# Patient Record
Sex: MALE | Race: WHITE | NOT HISPANIC OR LATINO | Employment: FULL TIME | URBAN - METROPOLITAN AREA
[De-identification: names, ages, dates, MRNs, and addresses within clinical notes are randomized per-mention and may not be internally consistent; named-entity substitution may affect disease eponyms.]

---
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
## 2020-08-17 PROBLEM — S02.2XXA CLOSED FRACTURE OF NASAL BONE: Status: ACTIVE | Noted: 2017-03-24

## 2020-08-17 PROBLEM — S02.2XXA CLOSED FRACTURE OF NASAL BONE: Status: RESOLVED | Noted: 2017-03-24 | Resolved: 2020-08-17

## 2021-02-21 ENCOUNTER — NURSE TRIAGE (OUTPATIENT)
Dept: NURSING | Facility: CLINIC | Age: 21
End: 2021-02-21

## 2021-02-21 NOTE — TELEPHONE ENCOUNTER
Mom reports pt drank tequilla last evening and is now vomiting.  Mom does not have concerns for alcohol addiction/withrawal, states pt consumes < 1 alcoholic drink per week.  Pt has been vomiting x 2.5 hours.      Disposition:  Home care, education provided.  Advised Mom to focus on fluid hydration.  She verbalized understanding and had no further questions.     COVID 19 Nurse Triage Plan/Patient Instructions    Please be aware that novel coronavirus (COVID-19) may be circulating in the community. If you develop symptoms such as fever, cough, or SOB or if you have concerns about the presence of another infection including coronavirus (COVID-19), please contact your health care provider or visit [email or web address].     Disposition/Instructions    Home care recommended. Follow home care protocol based instructions.    Thank you for taking steps to prevent the spread of this virus.  o Limit your contact with others.  o Wear a simple mask to cover your cough.  o Wash your hands well and often.    Resources    [facility]: About COVID-19: [email or web address]    CDC: What to Do If You're Sick: www.cdc.gov/coronavirus/2019-ncov/about/steps-when-sick.html    CDC: Ending Home Isolation: www.cdc.gov/coronavirus/2019-ncov/hcp/disposition-in-home-patients.html     CDC: Caring for Someone: www.cdc.gov/coronavirus/2019-ncov/if-you-are-sick/care-for-someone.html     [facility]: Interim Guidance for Hospital Discharge to Home: www.[facility]/diseases/coronavirus/hcp/hospdischarge.pdf    [facility] clinical trials (COVID-19 research studies): clinicalaffairs.[facility]/umn-clinical-trials     Below are the COVID-19 hotlines at the [facility] of Health ([facility]). Interpreters are available.   o For health questions: Call [phone] or 1-937.940.3480 (7 a.m. to 7 p.m.)  o For questions about schools and childcare: Call [phone] or 1-294.882.9206 (7 a.m. to 7 p.m.)             Smita Bhatti  "RN/FNA          Additional Information    Negative: Shock suspected (e.g., cold/pale/clammy skin, too weak to stand, low BP, rapid pulse)    Negative: Difficult to awaken or acting confused (e.g., disoriented, slurred speech)    Negative: Sounds like a life-threatening emergency to the triager    Negative: [1] Vomiting AND [2] contains red blood or black (\"coffee ground\") material  (Exception: few red streaks in vomit that only happened once)    Negative: Severe pain in one eye    Negative: Recent head injury (within last 3 days)    Negative: Recent abdominal injury (within last 3 days)    Negative: [1] Insulin-dependent diabetes (Type I) AND [2] glucose > 400 mg/dl (22 mmol/l)    Negative: [1] SEVERE vomiting (e.g., 6 or more times/day) AND [2] present > 8 hours    Negative: [1] MODERATE vomiting (e.g., 3 - 5 times/day) AND [2] age > 60    Negative: Severe headache (e.g., excruciating) (Exception: similar to previous migraines)    Negative: High-risk adult (e.g., diabetes mellitus, brain tumor, V-P shunt, hernia)    Negative: [1] Drinking very little AND [2] dehydration suspected (e.g., no urine > 12 hours, very dry mouth, very lightheaded)    Negative: Patient sounds very sick or weak to the triager    Negative: [1] Vomiting AND [2] abdomen looks much more swollen than usual    Negative: [1] Vomiting AND [2] contains bile (green color)    Negative: [1] Constant abdominal pain AND [2] present > 2 hours    Negative: [1] Fever > 103 F (39.4 C) AND [2] not able to get the fever down using Fever Care Advice    Negative: [1] Fever > 101 F (38.3 C) AND [2] age > 60    Negative: [1] Fever > 100.0 F (37.8 C) AND [2] bedridden (e.g., nursing home patient, CVA, chronic illness, recovering from surgery)    Negative: [1] Fever > 100.0 F (37.8 C) AND [2] weak immune system (e.g., HIV positive, cancer chemo, splenectomy, organ transplant, chronic steroids)    Negative: Taking any of the following medications: digoxin (Lanoxin), " lithium, theophylline, phenytoin (Dilantin)    Negative: [1] MILD or MODERATE vomiting AND [2] present > 48 hours (2 days) (Exception: mild vomiting with associated diarrhea)    Negative: Fever present > 3 days (72 hours)    Negative: Vomiting a prescription medication    Negative: [1] MILD vomiting with diarrhea AND [2] present > 5 days    Negative: Alcohol or drug abuse, known or suspected    Negative: Vomiting is a chronic symptom (recurrent or ongoing AND present > 4 weeks)    [1] SEVERE vomiting (e.g., 6 or more times/day, vomits everything) BUT [2] hydrated    Protocols used: VOMITING-A-AH

## 2021-04-29 ENCOUNTER — OFFICE VISIT (OUTPATIENT)
Dept: URGENT CARE | Facility: URGENT CARE | Age: 21
End: 2021-04-29
Payer: COMMERCIAL

## 2021-04-29 ENCOUNTER — ANCILLARY PROCEDURE (OUTPATIENT)
Dept: GENERAL RADIOLOGY | Facility: CLINIC | Age: 21
End: 2021-04-29
Attending: FAMILY MEDICINE
Payer: COMMERCIAL

## 2021-04-29 VITALS
WEIGHT: 157 LBS | RESPIRATION RATE: 16 BRPM | BODY MASS INDEX: 23.18 KG/M2 | TEMPERATURE: 97.6 F | HEART RATE: 87 BPM | DIASTOLIC BLOOD PRESSURE: 72 MMHG | SYSTOLIC BLOOD PRESSURE: 108 MMHG | OXYGEN SATURATION: 98 %

## 2021-04-29 DIAGNOSIS — S06.0X0A CONCUSSION WITHOUT LOSS OF CONSCIOUSNESS, INITIAL ENCOUNTER: Primary | ICD-10-CM

## 2021-04-29 DIAGNOSIS — M79.672 LEFT FOOT PAIN: ICD-10-CM

## 2021-04-29 PROCEDURE — 73630 X-RAY EXAM OF FOOT: CPT | Mod: LT | Performed by: RADIOLOGY

## 2021-04-29 PROCEDURE — 99204 OFFICE O/P NEW MOD 45 MIN: CPT | Performed by: FAMILY MEDICINE

## 2021-04-29 NOTE — LETTER
April 29, 2021      Zack Simmons  [address] [place] [id number]        To Whom It May Concern,      Zack Simmons was seen today. Please excuse him from work until May 3rd, 2021     Sincerely,        [facility] Provider

## 2021-04-29 NOTE — PROGRESS NOTES
Assessment & Plan     Concussion without loss of consciousness, initial encounter  No sing intracranial bleed. Discussed worrisome symptoms and when to seek care. educational info given.. recommended concussion referrral. Note for work given.   - CONCUSSION  REFERRAL    Left foot pain  Probably a contusion/strain. Conservative treatment measures discussed..   - XR Foot Left G/E 3 Views       [id number]}    Return in about 1 week (around 5/6/2021) as directed by the the concussion clinic. .    Chuy Jay MD  [facility]    ------------------------------------------------------------------------  Subjective     Zack Simmons presents to clinic today for the following health issues:  chief complaint  HPI  1: head injury. Ran into another skater and fell onto his head onto concrete. No loc. Was nauseated and vomitined once last night. None since. Still feels draggy, headache, neck stiffness, low energery among other typical concussion symptoms. The patient has a history of multiple concussion in the past.   2: foot pain. left since this same fall hurts about the 1st ray but some general achiness proximal up calf. No swelling noted. No redness nor bruising. He does not recall the [facility] of injury.       Review of Systems        Objective    /72 (BP Location: Right arm, Patient Position: Sitting, Cuff Size: Adult Regular)   Pulse 87   Temp 97.6  F (36.4  C) (Oral)   Resp 16   Wt 71.2 kg (157 lb)   SpO2 98%   BMI 23.18 kg/m    Physical Exam   GENERAL: healthy, alert and no distress  EYES: Eyes grossly normal to inspection, PERRL and conjunctivae and sclerae normal  HENT: ear canals and TM's normal, nose and mouth without ulcers or lesions  NECK: no adenopathy, no asymmetry, masses, or scars and thyroid normal to palpation  RESP: lungs clear to auscultation - no rales, rhonchi or wheezes  CV: regular rate and rhythm, normal S1 S2, no S3 or S4, no murmur, click or rub, no peripheral edema and  peripheral pulses strong  MS: left lower extremity with tenderness to palpation  ovfer the 1st ray. No deformity nor swelling nor bruising noted.  SKIN: no suspicious lesions or rashes  NEURO: Normal strength and tone, sensory exam grossly normal, mentation intact and speech normal. Cranial nerve exam negative. Coordination normal.      Xray of his foot is without fracture Per my interpretation.

## 2021-04-29 NOTE — PATIENT INSTRUCTIONS
"  Patient Education   Concussion Discharge Instructions  You were seen today for signs of a concussion. The symptoms will vary, depending on the nature of your injury and your health. You may have: headache, confusion, nausea (feel sick to your stomach), vomiting (throwing up) and problems with memory, concentrating or sleep. You may feel dizzy, irritable, and tired.   Children and teens may need help from their parents, teachers and coaches to watch for symptoms as they recover.  Follow-up  It is important for you to see a doctor for follow-up care to see how you are recovering. Please see your primary doctor within the next 5 to 7 days. You may have also been referred to the Concussion  service. They will contact you and arrange a follow-up visit if needed. If you need help sooner, you may call them at [phone].  Warning signs  Call your doctor or come back to Emergency if you suddenly have any of these symptoms:    Headaches that get worse    Feeling more and more drowsy    You keep repeating yourself    Strange behavior    Seizures    Repeat vomiting (throwing up)    Trouble walking    Growing confusion    Feeling more irritable    Neck pain that gets worse    Slurred speech    Weakness or numbness    Loss of consciousness    Fluid or blood coming from ears or nose  Self-care    Get lots of rest and get enough sleep at night. Take daytime naps or rest if you feel tired.    Limit physical activity and \"thinking\" activities. These can make symptoms worse.  ? Physical activity includes gym, sports, weight training, running, exercise and heavy lifting.  ? Thinking activities include homework, class work, job-related work and screen time (phone, computer, tablet, TV and video games).    Stick to a healthy diet and drink lots of fluids.    As symptoms improve, you may slowly return to your daily activities. If symptoms get worse   or return, reduce your activity.    Know that it is normal to feel sad and " frustrated when you do not feel right and are less active.  Going back to work    Your care team will tell you when you are ready to return to work.    Limit the amount of work you do soon after your injury. This may speed healing. Take breaks if your symptoms get worse. You should also reduce your physical activity as well as activities that require a lot of thinking until you see your doctor.    You may need shorter work days and a lighter workload.    Avoid heavy lifting, working with machinery, driving and working at heights until your symptoms are gone or you are cleared by a doctor.  Returning to sports    Never return to play if you have any symptoms. A full recovery will reduce the chances of getting hurt again. Remember, it is better to miss one or two games than a whole season.    You should rest from all physical activity until you see your doctor. Generally, if all symptoms have completely cleared, your doctor can help guide you to slowly return to sports. If symptoms return or worsen, stop the activity and see your doctor.    Important: If you are in an organized sport and under age 18, you will need written consent from a healthcare provider before you return to sports. Typically, this will be your primary care or sports medicine doctor. Please make an appointment.  Going back to school    If you are still having symptoms, you may need extra help at school.    Tell your teachers and school nurse about your injury and symptoms. Ask them to watch for problems with learning, memory and concentrating. Symptoms may get worse when you do schoolwork, and you may become more irritable.    You may need shorter school days, a reduced workload, and to postpone testing.    Do not drive or take gym class (physical activity) until cleared by a doctor.    For informational purposes only. Not to replace the advice of your health care provider.   2009 [employer]. Used with permission.  "This form is adapted from the \"Heads Up: Brain Injury in Your Practice\" tool kit developed by the Centers for Disease Control and Prevention (CDC). All rights reserved. [facility]. [employer] [id number] - Rev 03/17.       "

## 2021-06-10 ENCOUNTER — HOSPITAL ENCOUNTER (EMERGENCY)
Facility: CLINIC | Age: 21
Discharge: HOME | End: 2021-06-11
Attending: EMERGENCY MEDICINE | Admitting: EMERGENCY MEDICINE
Payer: COMMERCIAL

## 2021-06-10 ENCOUNTER — TELEPHONE (OUTPATIENT)
Dept: FAMILY MEDICINE | Facility: CLINIC | Age: 21
End: 2021-06-10

## 2021-06-10 DIAGNOSIS — R11.2 NON-INTRACTABLE VOMITING WITH NAUSEA, UNSPECIFIED VOMITING TYPE: ICD-10-CM

## 2021-06-10 DIAGNOSIS — K29.00 ACUTE GASTRITIS WITHOUT HEMORRHAGE, UNSPECIFIED GASTRITIS TYPE: ICD-10-CM

## 2021-06-10 LAB
ALBUMIN SERPL-MCNC: 4.4 G/DL (ref 3.4–5)
ALBUMIN UR-MCNC: 50 MG/DL
ALP SERPL-CCNC: 104 U/L (ref 40–150)
ALT SERPL W P-5'-P-CCNC: 23 U/L (ref 0–70)
ANION GAP SERPL CALCULATED.3IONS-SCNC: 11 MMOL/L (ref 3–14)
APPEARANCE UR: CLEAR
AST SERPL W P-5'-P-CCNC: 23 U/L (ref 0–45)
BACTERIA #/AREA URNS HPF: ABNORMAL /HPF
BASOPHILS # BLD AUTO: 0 10E9/L (ref 0–0.2)
BASOPHILS NFR BLD AUTO: 0.2 %
BILIRUB SERPL-MCNC: 1 MG/DL (ref 0.2–1.3)
BILIRUB UR QL STRIP: ABNORMAL
BUN SERPL-MCNC: 16 MG/DL (ref 7–30)
CALCIUM SERPL-MCNC: 9.2 MG/DL (ref 8.5–10.1)
CHLORIDE SERPL-SCNC: 98 MMOL/L (ref 94–109)
CK SERPL-CCNC: 187 U/L (ref 30–300)
CO2 SERPL-SCNC: 27 MMOL/L (ref 20–32)
COLOR UR AUTO: YELLOW
CREAT SERPL-MCNC: 0.86 MG/DL (ref 0.66–1.25)
DIFFERENTIAL METHOD BLD: ABNORMAL
EOSINOPHIL # BLD AUTO: 0 10E9/L (ref 0–0.7)
EOSINOPHIL NFR BLD AUTO: 0.1 %
ERYTHROCYTE [DISTWIDTH] IN BLOOD BY AUTOMATED COUNT: 12 % (ref 10–15)
GFR SERPL CREATININE-BSD FRML MDRD: >90 ML/MIN/{1.73_M2}
GLUCOSE SERPL-MCNC: 83 MG/DL (ref 70–99)
GLUCOSE UR STRIP-MCNC: NEGATIVE MG/DL
HCT VFR BLD AUTO: 42.4 % (ref 40–53)
HGB BLD-MCNC: 14.1 G/DL (ref 13.3–17.7)
HGB UR QL STRIP: NEGATIVE
IMM GRANULOCYTES # BLD: 0.1 10E9/L (ref 0–0.4)
IMM GRANULOCYTES NFR BLD: 0.4 %
KETONES UR STRIP-MCNC: >150 MG/DL
LEUKOCYTE ESTERASE UR QL STRIP: NEGATIVE
LIPASE SERPL-CCNC: 73 U/L (ref 73–393)
LYMPHOCYTES # BLD AUTO: 1.8 10E9/L (ref 0.8–5.3)
LYMPHOCYTES NFR BLD AUTO: 15.7 %
MAGNESIUM SERPL-MCNC: 2.2 MG/DL (ref 1.6–2.3)
MCH RBC QN AUTO: 30 PG (ref 26.5–33)
MCHC RBC AUTO-ENTMCNC: 33.3 G/DL (ref 31.5–36.5)
MCV RBC AUTO: 90 FL (ref 78–100)
MONOCYTES # BLD AUTO: 0.9 10E9/L (ref 0–1.3)
MONOCYTES NFR BLD AUTO: 7.6 %
MUCOUS THREADS #/AREA URNS LPF: PRESENT /LPF
NEUTROPHILS # BLD AUTO: 8.9 10E9/L (ref 1.6–8.3)
NEUTROPHILS NFR BLD AUTO: 76 %
NITRATE UR QL: NEGATIVE
NRBC # BLD AUTO: 0 10*3/UL
NRBC BLD AUTO-RTO: 0 /100
PH UR STRIP: 6 PH (ref 5–7)
PLATELET # BLD AUTO: 280 10E9/L (ref 150–450)
POTASSIUM SERPL-SCNC: 3.8 MMOL/L (ref 3.4–5.3)
PROT SERPL-MCNC: 8.2 G/DL (ref 6.8–8.8)
RBC # BLD AUTO: 4.7 10E12/L (ref 4.4–5.9)
RBC #/AREA URNS AUTO: 1 /HPF (ref 0–2)
SODIUM SERPL-SCNC: 136 MMOL/L (ref 133–144)
SOURCE: ABNORMAL
SP GR UR STRIP: 1.02 (ref 1–1.03)
SQUAMOUS #/AREA URNS AUTO: 0 /HPF (ref 0–1)
TROPONIN I SERPL-MCNC: <0.015 UG/L (ref 0–0.04)
UROBILINOGEN UR STRIP-MCNC: 2 MG/DL (ref 0–2)
WBC # BLD AUTO: 11.7 10E9/L (ref 4–11)
WBC #/AREA URNS AUTO: 4 /HPF (ref 0–5)

## 2021-06-10 PROCEDURE — 258N000003 HC RX IP 258 OP 636: Performed by: EMERGENCY MEDICINE

## 2021-06-10 PROCEDURE — 96361 HYDRATE IV INFUSION ADD-ON: CPT

## 2021-06-10 PROCEDURE — 80053 COMPREHEN METABOLIC PANEL: CPT | Performed by: EMERGENCY MEDICINE

## 2021-06-10 PROCEDURE — 82550 ASSAY OF CK (CPK): CPT | Performed by: EMERGENCY MEDICINE

## 2021-06-10 PROCEDURE — 93010 ELECTROCARDIOGRAM REPORT: CPT | Performed by: EMERGENCY MEDICINE

## 2021-06-10 PROCEDURE — 250N000009 HC RX 250: Performed by: EMERGENCY MEDICINE

## 2021-06-10 PROCEDURE — 83690 ASSAY OF LIPASE: CPT | Performed by: EMERGENCY MEDICINE

## 2021-06-10 PROCEDURE — 96375 TX/PRO/DX INJ NEW DRUG ADDON: CPT

## 2021-06-10 PROCEDURE — 99284 EMERGENCY DEPT VISIT MOD MDM: CPT | Mod: 25 | Performed by: EMERGENCY MEDICINE

## 2021-06-10 PROCEDURE — 83735 ASSAY OF MAGNESIUM: CPT | Performed by: EMERGENCY MEDICINE

## 2021-06-10 PROCEDURE — 84484 ASSAY OF TROPONIN QUANT: CPT | Performed by: EMERGENCY MEDICINE

## 2021-06-10 PROCEDURE — 85025 COMPLETE CBC W/AUTO DIFF WBC: CPT | Performed by: EMERGENCY MEDICINE

## 2021-06-10 PROCEDURE — 96365 THER/PROPH/DIAG IV INF INIT: CPT | Mod: 59

## 2021-06-10 PROCEDURE — 99284 EMERGENCY DEPT VISIT MOD MDM: CPT | Mod: 25

## 2021-06-10 PROCEDURE — 250N000011 HC RX IP 250 OP 636: Performed by: EMERGENCY MEDICINE

## 2021-06-10 PROCEDURE — 93005 ELECTROCARDIOGRAM TRACING: CPT

## 2021-06-10 PROCEDURE — 250N000013 HC RX MED GY IP 250 OP 250 PS 637: Performed by: EMERGENCY MEDICINE

## 2021-06-10 PROCEDURE — 81001 URINALYSIS AUTO W/SCOPE: CPT | Performed by: EMERGENCY MEDICINE

## 2021-06-10 RX ORDER — IBUPROFEN 200 MG
600 TABLET ORAL EVERY 6 HOURS PRN
COMMUNITY
End: 2024-07-26

## 2021-06-10 RX ORDER — ONDANSETRON 4 MG/1
4 TABLET, FILM COATED ORAL EVERY 6 HOURS PRN
COMMUNITY
End: 2024-07-26

## 2021-06-10 RX ORDER — SODIUM CHLORIDE 9 MG/ML
INJECTION, SOLUTION INTRAVENOUS CONTINUOUS
Status: DISCONTINUED | OUTPATIENT
Start: 2021-06-10 | End: 2021-06-11 | Stop reason: HOSPADM

## 2021-06-10 RX ORDER — ONDANSETRON 2 MG/ML
4 INJECTION INTRAMUSCULAR; INTRAVENOUS EVERY 30 MIN PRN
Status: DISCONTINUED | OUTPATIENT
Start: 2021-06-10 | End: 2021-06-11 | Stop reason: HOSPADM

## 2021-06-10 RX ADMIN — LIDOCAINE HYDROCHLORIDE 30 ML: 20 SOLUTION ORAL; TOPICAL at 20:12

## 2021-06-10 RX ADMIN — SODIUM CHLORIDE 1000 ML: 9 INJECTION, SOLUTION INTRAVENOUS at 21:55

## 2021-06-10 RX ADMIN — SODIUM CHLORIDE 1000 ML: 9 INJECTION, SOLUTION INTRAVENOUS at 20:11

## 2021-06-10 RX ADMIN — FAMOTIDINE 20 MG: 20 INJECTION, SOLUTION INTRAVENOUS at 22:37

## 2021-06-10 RX ADMIN — ONDANSETRON 4 MG: 2 INJECTION INTRAMUSCULAR; INTRAVENOUS at 20:13

## 2021-06-10 NOTE — TELEPHONE ENCOUNTER
"Pt mom called to see what to do about son that had vomiting sat night and last night after drinking alcohol  She said it looked brown but denied it looking like coffee grounds.  Pt has no ac at his house - so got picked up in the night to sleep in his moms house with a/c  Pt at work now and drove himself there, but called his mom to report that his eye lids were closing and his mouth is \"shaped like an o\"  writer advised mom to monitor pt for dry mouth, voiding once every 8 hours, if he is still feeling drowsy, eyes are shutting or his mouth is \"shaped like an o\", pt should be evaluated at the ER  If those sx are improved, pt should push fluids and electrolytes.  Mom will call son and touch base with him and either call back for further advice, take him to UC or take him to the ER.   Kiki Barbosa RN    "

## 2021-06-11 VITALS
DIASTOLIC BLOOD PRESSURE: 76 MMHG | SYSTOLIC BLOOD PRESSURE: 113 MMHG | RESPIRATION RATE: 16 BRPM | OXYGEN SATURATION: 98 % | WEIGHT: 145 LBS | TEMPERATURE: 97.1 F | HEART RATE: 66 BPM | BODY MASS INDEX: 21.41 KG/M2

## 2021-06-11 LAB — INTERPRETATION ECG - MUSE: NORMAL

## 2021-06-11 RX ORDER — FAMOTIDINE 20 MG/1
20 TABLET, FILM COATED ORAL 2 TIMES DAILY
Qty: 28 TABLET | Refills: 0 | Status: SHIPPED | OUTPATIENT
Start: 2021-06-11 | End: 2021-06-25

## 2021-06-11 RX ORDER — ONDANSETRON 4 MG/1
4 TABLET, ORALLY DISINTEGRATING ORAL EVERY 8 HOURS PRN
Qty: 9 TABLET | Refills: 0 | Status: SHIPPED | OUTPATIENT
Start: 2021-06-11 | End: 2021-06-14

## 2021-06-11 NOTE — ED NOTES
Patient tolerated drinking but endorses mild abdominal pain after having few bites of the sandwich. Patient denies nausea

## 2021-06-11 NOTE — DISCHARGE INSTRUCTIONS
Please make an appointment to follow up with Your Primary Care Provider as soon as possible.    Take the pepcid as prescribed. Can use zofran for nausea. Stay well hydrated. Avoid alcohol, NSAIDs (ibuprofen, advil, naproxen), spicy food.     Return to the ED if you develop worsening abdominal pain, fever, uncontrolled vomiting, blood in the stool or vomit, lack of bowel movements, chest pain, shortness of breath, confusion, or any new or worsening concerns.

## 2021-06-11 NOTE — ED PROVIDER NOTES
"    [facility] EMERGENCY DEPARTMENT ([facility])  6/10/21     History     Chief Complaint   Patient presents with     Nausea & Vomiting     reports he was at his friend's house last night and had a beer and a shot, he started having epigastric pain and nausea and vomiting around midnight has night, symptoms have persisted since, unable to keep food or fluids down     The history is provided by the patient and medical records.     Zack Simmons is a 20 year old male with a past medical history significant for concussion and migraines who presents here to the Emergency Department due to nausea, vomiting, and abdominal pain.  Patient reports last night he was at a friend's house and he had \"one beer and one shot.\"  He states around midnight he developed epigastric pain with associated nausea and vomiting.  He states those symptoms have persisted through the night and throughout the day today.  Patient reports his symptoms did not feel like a hangover.  Patient took zofran and ibuprofen for his symptoms.  He denies other recent abdominal pain or vomiting.  Patient states around 3 PM today he was driving to work when he had an episode during which he felt as if his face was \"locking up.\"  He reports this muscle spasm like episode lasted on and off for about 20 minutes, though he was able to continue driving to work.  He denies loss of consciousness during this episode though he does note he had some trouble breathing during the episode.  Patient reports he has had similar episodes of muscle spasming in the past, but he has not gotten it checked out.  Patient states the last time he had an episode was a few months ago.  He states at that time he was not able to drive and his girlfriend had to drive for him.  Patient does note that alcohol was involved in his previous \"locked up\" episode.  Patient denies numbness, weakness, or tingling.  He denies history of seizures.  Patient reports yesterday he was outside in the " sun all day in a pool and skateboarding.  He states he only had a slice of pizza to eat yesterday, he denies poorly cooked or bad food.  He states he does not have air conditioning in his apartment.  Patient denies lower abdominal pain or diarrhea.  No dysuria, difficulty urinating, or hematuria.  He denies abnormal bowel movements, his last BM was yesterday.  He states he has still been able to pass flatus.  Patient does note he has unintentionally lost 13 lb in the past 6 weeks.  He denies history of reflux or ulcers.  Patient denies drug use.  He states he drinks socially on weekends only.  Patient denies fevers or chills.  No cough.  Patient denies having had COVID-19, he denies known COVID exposure.  He states he received his first COVID vaccine on 06/05/2021.  Patient has a remote familial history of heart problems. No recent falls or head injury.     Past Medical History  Past Medical History:   Diagnosis Date     Closed fracture of nasal bone 3/24/2017    3/12/17     Concussion 9/17/2014     Enuresis      Fibula fracture 10/9/2013     Migraine      Nocturnal enuresis 10/26/2012     Seasonal allergies      Traumatic brain injury (H) 9/30/2014     Past Surgical History:   Procedure Laterality Date     NO HISTORY OF SURGERY       ORTHOPEDIC SURGERY  05/2019    left wrist     ibuprofen (ADVIL/MOTRIN) 200 MG tablet  ondansetron (ZOFRAN) 4 MG tablet      Allergies   Allergen Reactions     Birch Trees      Cats      Family History  Family History   Problem Relation Age of Onset     Diabetes Maternal Grandfather      Diabetes Paternal Grandfather      Social History   Social History     Tobacco Use     Smoking status: Never Smoker     Smokeless tobacco: Never Used   Substance Use Topics     Alcohol use: No     Drug use: Not Currently     Types: Marijuana      Past medical history, past surgical history, medications, allergies, family history, and social history were reviewed with the patient. No additional pertinent  items.       Review of Systems  A complete review of systems was performed with pertinent positives and negatives noted in the HPI, and all other systems negative.    Physical Exam   BP: 113/76  Pulse: 60  Temp: 97.1  F (36.2  C)  Resp: 16  Weight: 65.8 kg (145 lb)  SpO2: 96 %  Physical Exam  Vitals reviewed.   Constitutional:       General: He is not in acute distress.     Appearance: He is well-developed.   HENT:      Head: Normocephalic and atraumatic.      Mouth/Throat:      Mouth: Mucous membranes are moist.      Pharynx: No oropharyngeal exudate or posterior oropharyngeal erythema.   Eyes:      Extraocular Movements: Extraocular movements intact.      Conjunctiva/sclera: Conjunctivae normal.      Pupils: Pupils are equal, round, and reactive to light.   Cardiovascular:      Rate and Rhythm: Normal rate and regular rhythm.      Pulses: Normal pulses.      Heart sounds: Normal heart sounds. No murmur heard.     Pulmonary:      Effort: Pulmonary effort is normal. No respiratory distress.      Breath sounds: Normal breath sounds. No stridor. No wheezing or rales.   Abdominal:      General: Bowel sounds are normal. There is no distension.      Palpations: Abdomen is soft. There is no mass.      Tenderness: There is no abdominal tenderness. There is no right CVA tenderness, left CVA tenderness, guarding or rebound.   Musculoskeletal:         General: No swelling or tenderness. Normal range of motion.      Cervical back: Normal range of motion and neck supple. No rigidity.   Skin:     General: Skin is warm and dry.      Capillary Refill: Capillary refill takes less than 2 seconds.      Findings: No rash.   Neurological:      General: No focal deficit present.      Mental Status: He is alert and oriented to person, place, and time.      GCS: GCS eye subscore is 4. GCS verbal subscore is 5. GCS motor subscore is 6.      Cranial Nerves: Cranial nerves are intact. No cranial nerve deficit, dysarthria or facial asymmetry.  "     Sensory: Sensation is intact. No sensory deficit.      Motor: Motor function is intact. No weakness, abnormal muscle tone or pronator drift.      Coordination: Coordination normal. Finger-Nose-Finger Test and Heel to Shin Test normal.      Gait: Gait normal.      Deep Tendon Reflexes:      Reflex Scores:       Brachioradialis reflexes are 2+ on the right side and 2+ on the left side.       Patellar reflexes are 2+ on the right side and 2+ on the left side.     Comments: 5/5 strength in all 4 extremities bilaterally. Sensation intact to light touch in all 4 extremities and the face bilaterally.    Psychiatric:         Mood and Affect: Mood normal.         ED Course     9:23 PM  The patient was seen and examined by Lucia Gutierrez MD in Room ED07.    Procedures            EKG Interpretation:      Interpreted by Lucia Gutierrez MD  Time reviewed: 2233  Symptoms at time of EKG: previous \"locking up\" episdoe   Rhythm: sinus bradycardia with PAC  Rate: 57  Axis: normal  Ectopy: PAC  Conduction: normal  ST Segments/ T Waves: No ST-T wave changes  Q Waves: none  Comparison to prior: No old EKG available    Clinical Impression: no evidence of acute ischemia. No signs of pericarditis. No evidence of WPW, brugada, LVH, or right ventricular dysplasia.              Results for orders placed or performed during the hospital encounter of 06/10/21   CBC with platelets differential     Status: Abnormal   Result Value Ref Range    WBC 11.7 (H) 4.0 - 11.0 10e9/L    RBC Count 4.70 4.4 - 5.9 10e12/L    Hemoglobin 14.1 13.3 - 17.7 g/dL    Hematocrit 42.4 40.0 - 53.0 %    MCV 90 78 - 100 fl    MCH 30.0 26.5 - 33.0 pg    MCHC 33.3 31.5 - 36.5 g/dL    RDW 12.0 10.0 - 15.0 %    Platelet Count 280 150 - 450 10e9/L    Diff Method Automated Method     % Neutrophils 76.0 %    % Lymphocytes 15.7 %    % Monocytes 7.6 %    % Eosinophils 0.1 %    % Basophils 0.2 %    % Immature Granulocytes 0.4 %    Nucleated RBCs 0 0 /100    Absolute " Neutrophil 8.9 (H) 1.6 - 8.3 10e9/L    Absolute Lymphocytes 1.8 0.8 - 5.3 10e9/L    Absolute Monocytes 0.9 0.0 - 1.3 10e9/L    Absolute Eosinophils 0.0 0.0 - 0.7 10e9/L    Absolute Basophils 0.0 0.0 - 0.2 10e9/L    Abs Immature Granulocytes 0.1 0 - 0.4 10e9/L    Absolute Nucleated RBC 0.0    Comprehensive metabolic panel     Status: None   Result Value Ref Range    Sodium 136 133 - 144 mmol/L    Potassium 3.8 3.4 - 5.3 mmol/L    Chloride 98 94 - 109 mmol/L    Carbon Dioxide 27 20 - 32 mmol/L    Anion Gap 11 3 - 14 mmol/L    Glucose 83 70 - 99 mg/dL    Urea Nitrogen 16 7 - 30 mg/dL    Creatinine 0.86 0.66 - 1.25 mg/dL    GFR Estimate >90 >60 mL/min/[1.73_m2]    GFR Estimate If Black >90 >60 mL/min/[1.73_m2]    Calcium 9.2 8.5 - 10.1 mg/dL    Bilirubin Total 1.0 0.2 - 1.3 mg/dL    Albumin 4.4 3.4 - 5.0 g/dL    Protein Total 8.2 6.8 - 8.8 g/dL    Alkaline Phosphatase 104 40 - 150 U/L    ALT 23 0 - 70 U/L    AST 23 0 - 45 U/L   Lipase     Status: None   Result Value Ref Range    Lipase 73 73 - 393 U/L   UA with Microscopic     Status: Abnormal   Result Value Ref Range    Color Urine Yellow     Appearance Urine Clear     Glucose Urine Negative NEG^Negative mg/dL    Bilirubin Urine Small (A) NEG^Negative    Ketones Urine >150 (A) NEG^Negative mg/dL    Specific Gravity Urine 1.020 1.003 - 1.035    Blood Urine Negative NEG^Negative    pH Urine 6.0 5.0 - 7.0 pH    Protein Albumin Urine 50 (A) NEG^Negative mg/dL    Urobilinogen mg/dL 2.0 0.0 - 2.0 mg/dL    Nitrite Urine Negative NEG^Negative    Leukocyte Esterase Urine Negative NEG^Negative    Source Nasopharyngeal     WBC Urine 4 0 - 5 /HPF    RBC Urine 1 0 - 2 /HPF    Bacteria Urine None (A) NEG^Negative /HPF    Squamous Epithelial /HPF Urine 0 0 - 1 /HPF    Mucous Urine Present (A) NEG^Negative /LPF   Magnesium     Status: None   Result Value Ref Range    Magnesium 2.2 1.6 - 2.3 mg/dL   CK total     Status: None   Result Value Ref Range    CK Total 187 30 - 300 U/L  "  Troponin I     Status: None   Result Value Ref Range    Troponin I ES <0.015 0.000 - 0.045 ug/L   EKG 12 lead     Status: None   Result Value Ref Range    Interpretation ECG Click View Image link to view waveform and result      Medications   0.9% sodium chloride BOLUS (0 mLs Intravenous Stopped 6/10/21 2126)   lidocaine (XYLOCAINE) 2 % 15 mL, alum & mag hydroxide-simethicone (MAALOX) 15 mL GI Cocktail (30 mLs Oral Given 6/10/21 2012)   0.9% sodium chloride BOLUS (0 mLs Intravenous Stopped 6/11/21 0032)   famotidine (PEPCID) infusion 20 mg (0 mg Intravenous Stopped 6/10/21 2253)        Assessments & Plan (with Medical Decision Making)   On exam, patient is overall well-appearing in no acute distress.  He presents with some epigastric discomfort as well as nausea vomiting after drinking last night.  Differential diagnosis includes but is not limited to gastritis, viral syndrome, ulcer, pancreatitis, less likely gallbladder pathology, dehydration, electrolyte abnormality.  He also was recently vaccinated for Covid a few days ago however this seems less likely to be the cause of any symptoms as he specifically started having the symptoms after drinking alcohol last night.  He denies any drug use as we did consider marijuana hyperemesis syndrome but denies this.  He also reports he had an episode where he feels like he \"locks up\" but does not lose consciousness through the episode and does not have any tongue biting or loss of bowel or bladder control or any tremors.  Overall feel that this is unlikely to represent a seizure.  He has not had any head injury recently and does not have any focal neurologic deficits.  He does not have any evidence of muscle spasm.  This could potentially be related to dehydration as it has occurred with alcohol use in the past.  He does not drink daily thus felt that alcohol withdrawal would be unlikely.  Also considered electrolyte derangement.  His abdominal exam is benign without " tenderness.  Thus feel that significant intra-abdominal pathology such as bowel obstruction or bowel perforation would be unlikely.  Also has no right upper quadrant tenderness on exam thus making gallbladder pathology less likely overall with his symptoms here.    IV access was established and he was given 2 L of IV fluid.  He was given a GI cocktail as well as IV Pepcid and Zofran.  His mom does state he has been losing some weight so do consider the potential of more of a chronic gastritis or ulcer but has no signs of GI bleeding.  Laboratory studies were initiated.  CK is within normal limits making rhabdomyolysis less likely.  Did obtain an EKG and troponin which were negative and the EKG showed sinus rhythm without evidence of acute ischemia.  No sign of Brugada syndrome, WPW, or LVH.  Unsure of what these locking up episodes truly are but without loss of consciousness or presyncopal symptoms or chest pain or shortness of breath felt that cardiac cause would be unlikely.  Lipase was within normal limits making pancreatitis less likely.  CMP is largely unremarkable with normal electrolytes and magnesium is normal.  Normal liver function and kidney function.  Urinalysis reveals some ketones as expected with tight dehydration but no signs of hematuria or UTI.  CBC reveals a white blood cell count of 11.7 with hemoglobin of 14.  He has been afebrile and has normal vital signs here and no specific infectious symptoms other than the vomiting.  There could be a potential viral syndrome related to this as well versus some slight leukocytosis related to vomiting.  He does not have a cough or any upper respiratory symptoms.  He does not have any abdominal tenderness and thus do not feel he warrants imaging of the chest or abdomen and did discuss this with the patient and his mother who were also in agreement and avoidance of unnecessary radiation.    On reevaluation he was feeling improved.  He was able to tolerate p.o.  here.  He was given a prescription for Pepcid as well as Zofran and advised to follow-up with his primary care provider.  With the concern for gastritis advised him to avoid alcohol NSAIDs and spicy food.  He voiced understanding and was comfortable with this plan.  He was given indications for return to the emergency department.  He and his mother voiced understanding and were comfortable with discharge.  He was discharged home in improved condition.    I have reviewed the nursing notes. I have reviewed the findings, diagnosis, plan and need for follow up with the patient.    Discharge Medication List as of 6/11/2021 12:33 AM      START taking these medications    Details   famotidine (PEPCID) 20 MG tablet Take 1 tablet (20 mg) by mouth 2 times daily for 14 days, Disp-28 tablet, R-0, E-Prescribe      ondansetron (ZOFRAN ODT) 4 MG ODT tab Take 1 tablet (4 mg) by mouth every 8 hours as needed for nausea or vomiting, Disp-9 tablet, R-0, E-Prescribe             Final diagnoses:   Non-intractable vomiting with nausea, unspecified vomiting type   Acute gastritis without hemorrhage, unspecified gastritis type   I, Marj Benavidez, am serving as a trained medical scribe to document services personally performed by Lucia Gutierrez MD, based on the provider's statements to me.     I, Lucia Gutierrez MD, was physically present and have reviewed and verified the accuracy of this note documented by Marj Benavidez.     --  Lucia Gutierrez MD  [facility] EMERGENCY DEPARTMENT  6/10/2021     Lucia Gutierrez MD  08/03/21 1242

## 2021-06-11 NOTE — ED NOTES
Patient reported he has been vomiting since Midnight, epigastric pain, he also endorses tingling sensation of the face and mouth and stiffening of fingers, denies history of seizures.

## 2021-11-06 ENCOUNTER — HOSPITAL ENCOUNTER (EMERGENCY)
Age: 21
Discharge: LEFT BEFORE DISCHARGE | End: 2021-11-06

## 2021-11-06 VITALS
SYSTOLIC BLOOD PRESSURE: 120 MMHG | DIASTOLIC BLOOD PRESSURE: 79 MMHG | HEART RATE: 84 BPM | RESPIRATION RATE: 16 BRPM | TEMPERATURE: 97.2 F | OXYGEN SATURATION: 97 %

## 2021-11-06 PROCEDURE — 10002803 HB RX 637

## 2021-11-06 PROCEDURE — 10003627 HB COUNTER ED NO SERVICE

## 2021-11-06 RX ORDER — ONDANSETRON 4 MG/1
TABLET, ORALLY DISINTEGRATING ORAL
Status: COMPLETED
Start: 2021-11-06 | End: 2021-11-06

## 2021-11-06 RX ORDER — ONDANSETRON 4 MG/1
4 TABLET, ORALLY DISINTEGRATING ORAL ONCE
Status: COMPLETED | OUTPATIENT
Start: 2021-11-06 | End: 2021-11-06

## 2021-11-06 RX ADMIN — ONDANSETRON 4 MG: 4 TABLET, ORALLY DISINTEGRATING ORAL at 00:44

## 2021-11-06 ASSESSMENT — PAIN SCALES - GENERAL: PAINLEVEL_OUTOF10: 0

## 2022-03-06 ENCOUNTER — NURSE TRIAGE (OUTPATIENT)
Dept: NURSING | Facility: CLINIC | Age: 22
End: 2022-03-06
Payer: COMMERCIAL

## 2022-03-06 ENCOUNTER — TRANSFERRED RECORDS (OUTPATIENT)
Dept: HEALTH INFORMATION MANAGEMENT | Facility: CLINIC | Age: 22
End: 2022-03-06

## 2022-03-06 NOTE — TELEPHONE ENCOUNTER
He has been vomiting for 3 hours non-stop    It looks brick red/brown    There is also coffee ground material    He is not on any medications    He last urinated a couple of hours ago    He is feeling dizzy and lightheaded    No abdominal pain    No diarrhea    He has the chills    He is unable to walk and he has vomited a large amount of blood already.    Mother states that she is unable to get him to the car on her own so she will call 911 to get him to the ED.    Evie Guajardo RN  [facility] Nurse Advisor  11:17 AM  3/6/2022    COVID 19 Nurse Triage Plan/Patient Instructions    Please be aware that novel coronavirus (COVID-19) may be circulating in the community. If you develop symptoms such as fever, cough, or SOB or if you have concerns about the presence of another infection including coronavirus (COVID-19), please contact your health care provider or visit [email or web address].     Disposition/Instructions    Call to EMS/911 recommended. Follow protocol based instructions.     Bring Your Own Device:  Please also bring your smart device(s) (smart phones, tablets, laptops) and their charging cables for your personal use and to communicate with your care team during your visit.    Thank you for taking steps to prevent the spread of this virus.  o Limit your contact with others.  o Wear a simple mask to cover your cough.  o Wash your hands well and often.    Resources    [facility]: About COVID-19: [email or web address]    CDC: What to Do If You're Sick: www.cdc.gov/coronavirus/2019-ncov/about/steps-when-sick.html    CDC: Ending Home Isolation: www.cdc.gov/coronavirus/2019-ncov/hcp/disposition-in-home-patients.html     CDC: Caring for Someone: www.cdc.gov/coronavirus/2019-ncov/if-you-are-sick/care-for-someone.html     [facility]: Interim Guidance for Hospital Discharge to Home: www.health.[facility].[place]/diseases/coronavirus/hcp/hospdischarge.pdf    [facility] clinical trials  (COVID-19 research studies): [email or web address]-[email or web address]-trials     Below are the COVID-19 hotlines at the [place] Department of [facility] ([facility]). Interpreters are available.   o For health questions: Call 345-264-2866 or 1-752.450.9835 (7 a.m. to 7 p.m.)  o For questions about schools and childcare: Call 692-488-8185 or 1-611.226.7851 (7 a.m. to 7 p.m.)                         Reason for Disposition    Unable to walk, or can only walk with assistance (e.g., requires support)    Protocols used: VOMITING BLOOD-A-AH

## 2023-08-21 ENCOUNTER — HOSPITAL ENCOUNTER (EMERGENCY)
Facility: CLINIC | Age: 23
Discharge: HOME | End: 2023-08-22
Attending: EMERGENCY MEDICINE
Payer: COMMERCIAL

## 2023-08-21 ENCOUNTER — APPOINTMENT (OUTPATIENT)
Dept: GENERAL RADIOLOGY | Facility: CLINIC | Age: 23
End: 2023-08-21
Attending: EMERGENCY MEDICINE
Payer: COMMERCIAL

## 2023-08-21 ENCOUNTER — HOSPITAL ENCOUNTER (EMERGENCY)
Facility: CLINIC | Age: 23
Discharge: LEFT BEFORE DISCHARGE | End: 2023-08-21
Payer: COMMERCIAL

## 2023-08-21 DIAGNOSIS — S82.831A CLOSED FRACTURE OF DISTAL END OF RIGHT FIBULA, UNSPECIFIED FRACTURE MORPHOLOGY, INITIAL ENCOUNTER: ICD-10-CM

## 2023-08-21 PROCEDURE — 96374 THER/PROPH/DIAG INJ IV PUSH: CPT | Performed by: EMERGENCY MEDICINE

## 2023-08-21 PROCEDURE — 96376 TX/PRO/DX INJ SAME DRUG ADON: CPT | Performed by: EMERGENCY MEDICINE

## 2023-08-21 PROCEDURE — 250N000011 HC RX IP 250 OP 636: Performed by: EMERGENCY MEDICINE

## 2023-08-21 PROCEDURE — 99285 EMERGENCY DEPT VISIT HI MDM: CPT | Mod: 25 | Performed by: EMERGENCY MEDICINE

## 2023-08-21 PROCEDURE — 73610 X-RAY EXAM OF ANKLE: CPT | Mod: RT

## 2023-08-21 PROCEDURE — 73630 X-RAY EXAM OF FOOT: CPT | Mod: RT

## 2023-08-21 PROCEDURE — 99284 EMERGENCY DEPT VISIT MOD MDM: CPT | Performed by: EMERGENCY MEDICINE

## 2023-08-21 PROCEDURE — 96375 TX/PRO/DX INJ NEW DRUG ADDON: CPT | Performed by: EMERGENCY MEDICINE

## 2023-08-21 RX ORDER — HYDROMORPHONE HYDROCHLORIDE 1 MG/ML
0.5 INJECTION, SOLUTION INTRAMUSCULAR; INTRAVENOUS; SUBCUTANEOUS ONCE
Status: COMPLETED | OUTPATIENT
Start: 2023-08-21 | End: 2023-08-21

## 2023-08-21 RX ADMIN — HYDROMORPHONE HYDROCHLORIDE 0.5 MG: 1 INJECTION, SOLUTION INTRAMUSCULAR; INTRAVENOUS; SUBCUTANEOUS at 21:26

## 2023-08-21 RX ADMIN — HYDROMORPHONE HYDROCHLORIDE 0.5 MG: 1 INJECTION, SOLUTION INTRAMUSCULAR; INTRAVENOUS; SUBCUTANEOUS at 22:19

## 2023-08-21 RX ADMIN — MIDAZOLAM 1 MG: 1 INJECTION INTRAMUSCULAR; INTRAVENOUS at 23:22

## 2023-08-21 ASSESSMENT — ACTIVITIES OF DAILY LIVING (ADL)
ADLS_ACUITY_SCORE: 33
ADLS_ACUITY_SCORE: 35

## 2023-08-22 ENCOUNTER — APPOINTMENT (OUTPATIENT)
Dept: GENERAL RADIOLOGY | Facility: CLINIC | Age: 23
End: 2023-08-22
Attending: EMERGENCY MEDICINE
Payer: COMMERCIAL

## 2023-08-22 ENCOUNTER — TELEPHONE (OUTPATIENT)
Dept: ORTHOPEDICS | Facility: CLINIC | Age: 23
End: 2023-08-22
Payer: COMMERCIAL

## 2023-08-22 VITALS
HEART RATE: 54 BPM | SYSTOLIC BLOOD PRESSURE: 90 MMHG | OXYGEN SATURATION: 98 % | RESPIRATION RATE: 18 BRPM | DIASTOLIC BLOOD PRESSURE: 53 MMHG | TEMPERATURE: 98.8 F

## 2023-08-22 PROCEDURE — 250N000013 HC RX MED GY IP 250 OP 250 PS 637: Performed by: EMERGENCY MEDICINE

## 2023-08-22 PROCEDURE — 999N000065 XR ANKLE RIGHT G/E 3 VIEWS: Mod: RT

## 2023-08-22 RX ORDER — HYDROCODONE BITARTRATE AND ACETAMINOPHEN 5; 325 MG/1; MG/1
1 TABLET ORAL
Status: COMPLETED | OUTPATIENT
Start: 2023-08-22 | End: 2023-08-22

## 2023-08-22 RX ORDER — HYDROCODONE BITARTRATE AND ACETAMINOPHEN 5; 325 MG/1; MG/1
1-2 TABLET ORAL
Status: COMPLETED | OUTPATIENT
Start: 2023-08-22 | End: 2023-08-22

## 2023-08-22 RX ORDER — HYDROCODONE BITARTRATE AND ACETAMINOPHEN 5; 325 MG/1; MG/1
1 TABLET ORAL EVERY 6 HOURS PRN
Qty: 15 TABLET | Refills: 0 | Status: SHIPPED | OUTPATIENT
Start: 2023-08-22 | End: 2024-07-26

## 2023-08-22 RX ADMIN — HYDROCODONE BITARTRATE AND ACETAMINOPHEN 1 TABLET: 5; 325 TABLET ORAL at 00:58

## 2023-08-22 RX ADMIN — HYDROCODONE BITARTRATE AND ACETAMINOPHEN 1 TABLET: 5; 325 TABLET ORAL at 00:33

## 2023-08-22 NOTE — TELEPHONE ENCOUNTER
Spoke with Dr. Allen's team. Pt can come in later today. ATC called pt and he informed that he's currently at [facility]. Pt will proceed his care there. He will give us a call if he needs anything from us.        -[facility]- Orthopedics       ----- Message from Obdulio Nicole MD sent at 8/22/2023 12:54 AM CDT -----  Regarding: Clinic Follow Up  Hi team,    Can this patient please be scheduled for follow up with Dr. Queen within 1 week. They sustained a right bimalleolar equivalent ankle fracture on 8/21/2023.  They underwent closed reduction and immobilization in a short leg splint in the emergency department.     Thanks!  Sim Nicole MD  Orthopedic Surgery, PGY-2

## 2023-08-22 NOTE — CONSULTS
"[facility]  ORTHOPAEDIC SURGERY CONSULT - HISTORY AND PHYSICAL    DATE OF CONSULT: 8/22/2023 12:46 AM    REQUESTING PROVIDER: Yvette Brewer MD, MD - [facility] Staff.    CC: Right ankle pain    DATE OF INJURY: 8/21/2023    HISTORY OF PRESENT ILLNESS:   The orthopaedic surgery service was consulted by Yvette Sommer MD for evaluation and treatment recommendations of right ankle fracture.    Zack Simmons is a 23 year old male without any significant past medical history who sustained a right ankle fracture on the day of presentation.  Per the patient, he was skateboarding on the day of presentation when he fell perhaps about 3 or 4 feet and injured his right ankle.  The patient experienced immediate pain and noticed deformity of his right ankle and was no longer able to ambulate after the injury.  Due to this, he presented to the emergency department.  The patient denies any prior injuries to this ankle although he does note a prior surgery for excision of a \"bone spur \".  He states that he is an avid skateboarder and is hopeful that he can recover from this injury and continue skateboarding in the future.  Overall, he states he is quite healthy and does not take medications for anything.    Denies numbness, tingling, or weakness to the affected extremities.  Denies fevers, chills, nausea, vomiting, diarrhea, constipation, chest pain, shortness of breath.    PAST MEDICAL HISTORY:   Past Medical History:   Diagnosis Date    Closed fracture of nasal bone 3/24/2017    3/12/17    Concussion 9/17/2014    Enuresis     Fibula fracture 10/9/2013    Migraine     Nocturnal enuresis 10/26/2012    Seasonal allergies     Traumatic brain injury (H) 9/30/2014     [Patient denies any personal history of bleeding disorders, clotting disorders, or adverse reactions to anesthesia].    PAST SURGICAL HISTORY:    Past Surgical History:   Procedure Laterality Date    NO HISTORY OF SURGERY      ORTHOPEDIC SURGERY  05/2019    left " wrist       MEDICATIONS:   Prior to Admission medications    Medication Sig Last Dose Taking? Auth Provider Long Term End Date   HYDROcodone-acetaminophen (NORCO) 5-325 MG tablet Take 1 tablet by mouth every 6 hours as needed for pain  Yes Yvette Brewer MD     ibuprofen (ADVIL/MOTRIN) 200 MG tablet Take 600 mg by mouth every 6 hours as needed for mild pain   Reported, Patient     ondansetron (ZOFRAN) 4 MG tablet Take 4 mg by mouth every 6 hours as needed for nausea   Reported, Patient         ALLERGIES:   Birch trees and Cats    SOCIAL HISTORY:   Social History     Socioeconomic History    Marital status: Single     Spouse name: Not on file    Number of children: Not on file    Years of education: Not on file    Highest education level: Not on file   Occupational History    Not on file   Tobacco Use    Smoking status: Never    Smokeless tobacco: Never   Substance and Sexual Activity    Alcohol use: No    Drug use: Not Currently     Types: Marijuana    Sexual activity: Not on file   Other Topics Concern    Not on file   Social History Narrative    Not on file     Social Determinants of Health     Financial Resource Strain: Not on file   Food Insecurity: Not on file   Transportation Needs: Not on file   Physical Activity: Not on file   Stress: Not on file   Social Connections: Not on file   Intimate Partner Violence: Not on file   Housing Stability: Not on file     Living situation: Patient lives lives locally with his girlfriend  Occupation: Works at a [employer], but states that 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     REVIEW OF SYSTEMS:   10-point reviews of systems was negative except as noted  above in the HPI.     PHYSICAL EXAM:   Vitals:    08/21/23 2030   BP: 95/63   Pulse: 73   Resp: 16   Temp: 98.3  F (36.8  C)   SpO2: 100%     General: Awake, alert, appropriate, following commands, NAD.  Lungs: Breathing comfortably and nonlabored, no wheezes or stridor noted.  Heart/Cardiovascular: Regular pulse, no peripheral cyanosis.  Right Lower Extremity: There is obvious swelling about the right ankle.  No open wounds are appreciated.  The swelling is most prominent medially over the medial malleolus.  There is severe tenderness about the right ankle.  Range of motion is intact in the right hip and right knee.  Range of motion of the right ankle was deferred due to known injury.  The patient is able to wiggle his toes.  Sensation is intact to light touch in all of the major nerve distributions of the right foot.  Dorsalis pedis and posterior tibial pulse are normal in the right foot.  Left Lower Extremity: No deformity, skin intact. No significant tenderness to palpation over thigh, knee, leg, ankle/foot. No pain with ROM hip/knee/ankle. Motor intact distally TA/GSC/EHL/FHL with 5/5 strength. SILT sp/dp/tibial/saph/sural nerves. DP/PT pulses palpable, 2+, toes warm and well perfused.     LABS:  Hemoglobin   Date Value Ref Range Status   06/10/2021 14.1 13.3 - 17.7 g/dL Final     WBC   Date Value Ref Range Status   06/10/2021 11.7 (H) 4.0 - 11.0 10e9/L Final     Platelet Count   Date Value Ref Range Status   06/10/2021 280 150 - 450 10e9/L Final     No results found for: INR  Creatinine   Date Value Ref Range Status   06/10/2021 0.86 0.66 - 1.25 mg/dL Final     Glucose   Date Value Ref Range Status   06/10/2021 83 70 - 99 mg/dL Final     No results found for: CRP  No results found for: SED      IMAGING:  X-ray of the right ankle demonstrates a Roque B fibular fracture with appearance of slight lateral subluxation of the talus with associated increased medial clear space on the mortise view.    Postreduction  and splinting x-rays of the right ankle redemonstrate known Roque B fracture of the fibula.  Ankle alignment appears improved, in particular with the mortise view no longer demonstrating increased medial clear space.    Procedures:  Fracture reduction and immobilization  After verbal consent was obtained the patient underwent a right ankle hematoma block with 10 cc of 1% lidocaine placed into the right ankle medial gutter.  The patient subsequently received 1 mg of Versed for anxiolysis.  The patient then had his right ankle fracture reduced with a Kenton maneuver and palpable reduction of the subluxed talus was appreciated.  The patient was then placed into a short leg splint with posterior slab and side struts.  A varus mold was then applied to this splint to maintain fracture reduction.  The patient tolerated this procedure well without any immediate complications and the post procedure period.  His neurovascular status was confirmed after the procedure and the patient maintained ability to wiggle his toes and had sensation intact to light touch grossly over the exposed toes.  His digits remained warm and well-perfused.    IMPRESSION:   Zack Simmons is a 23 year old male without past medical history who sustained a right Roque B/bimalleolar equivalent fracture secondary to a skateboarding accident.  Discussed with the patient that given the likely instability of his ankle injury as evidenced by the lateral subluxation of the talus and increased medial clear space on the mortise view it is possible that this injury will require operative intervention.  However, discussed with the patient that this can be performed on an outpatient basis and there is no need for urgent operative intervention.  For the time being the patient had his injury reduced and immobilized in a short leg splint.  The patient is to maintain nonweightbearing of the right lower extremity until follow-up in clinic.  The patient will be seen in  clinic where reevaluation will be performed and decision regarding surgical intervention can then take place.  The patient was amenable with this plan and all of his questions were answered to the best of our abilities at this time.    RECOMMENDATIONS:   - Plan for OR: Possible open reduction internal fixation pending clinic follow-up  - Anticoagulation/DVT Prophylaxis: None  - Antibiotics/Tetanus: None  - X-rays/Imaging: Complete  - Activity: Remain in short leg splint until clinic follow-up  - Weight bearing: Nonweightbearing right lower extremity  - Pain control: Multimodal, per ED  - Diet: Regular from orthopedics perspective  - Follow-up: Follow-up in clinic with Dr. Queen in 1 week  - Disposition: Okay to discharge from emergency department from orthopedics perspective    Assessment and Plan to be discussed with Dr. Orr, orthopedic senior resident.  Staff for this patient is Dr. Queen.    Sim Nicole MD  PGY-2  Orthopaedic Surgery

## 2023-08-22 NOTE — DISCHARGE INSTRUCTIONS
Please make an appointment to follow up with Orthopedics Clinic (phone: [phone]) within 1 week.  They should be calling you to schedule a follow up appointment.     You can take vicodin for pain if needed.  There is tylenol in vicodin.  Therefore do not take vicodin and tylenol together.  You can take tylenol OR vicodin every 6 hours for pain if needed.      Keep your splint on and dry.  (Cover it with a plastic bag if bathing.)     No weightbearing on your right leg.  Use crutches to get around.

## 2023-08-22 NOTE — ED TRIAGE NOTES
Triage Assessment       Row Name 08/21/23 2030       Triage Assessment (Adult)    Airway WDL WDL       Respiratory WDL    Respiratory WDL WDL       Skin Circulation/Temperature WDL    Skin Circulation/Temperature WDL WDL       Cardiac WDL    Cardiac WDL WDL       Peripheral/Neurovascular WDL    Peripheral Neurovascular WDL WDL       Cognitive/Neuro/Behavioral WDL    Cognitive/Neuro/Behavioral WDL WDL

## 2023-08-22 NOTE — ED PROVIDER NOTES
ED Provider Note  [facility]      History     Chief Complaint   Patient presents with    Ankle Pain     Onset one hour ago, fell skateboarding, went off a drop, pain in right ankle and lower leg.     HPI  Zack Simmons is a 23 year old male who is here for evaluation of ankle pain.  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 He fell tonight while skateboarding and when he fell he heard a snap in his lower right leg. He feels numb in that area and is having significant pain.  He does not want to move his right lower leg cannot bear weight on it.  He denies other injuries.  He has had surgery in his right lower leg/foot in the past.  He last ate around noon today.         Physical Exam   BP: 95/63  Pulse: 73  Temp: 98.3  F (36.8  C)  Resp: 16  SpO2: 100 %  Physical Exam  Vitals and nursing note reviewed.   Constitutional:       Appearance: He is normal weight.   HENT:      Head: Normocephalic and atraumatic.      Nose: No congestion or rhinorrhea.   Eyes:      Extraocular Movements: Extraocular movements intact.   Cardiovascular:      Rate and Rhythm: Normal rate.   Pulmonary:      Effort: Pulmonary effort is normal.   Musculoskeletal:         General: Swelling, tenderness and signs of injury present. No deformity.      Cervical back: Normal range of motion.      Comments: Right ankle appears swollen and tender.  He does not want to move his leg due to pain. No pain above right ankle. He can wiggle his toes.  Pulses intact. No skin breakdown.    Neurological:      Mental Status: He is alert and oriented to person, place, and time.   Psychiatric:         Mood and Affect: Mood is anxious.           ED Course, Procedures, & Data      [facility]    Splint Application    Date/Time: 8/22/2023 11:28 AM    Performed by: Yvette Brewer MD  Authorized by: Yvette Brewer MD    Risks, benefits and alternatives discussed.      PRE-PROCEDURE  DETAILS     Sensation:  Normal    Skin color:  Normal    PROCEDURE DETAILS     Laterality:  Right    Location:  Ankle    Ankle:  R ankle    Cast type:  Short leg (short legged rj)    Splint type: u and posterior using plaster.    Supplies:  Plaster and cotton padding    POST PROCEDURE DETAILS     Pain:  Improved    Sensation:  Normal    Skin color:  Normal, good cap refill      PROCEDURE  Describe Procedure: Splint placed by orthopedic md with my assistance.  Mortise is slightly widened so pressure placed when splint placed to help move the talus medially slightly . Tolerated it well. He was given dilaudid iv prior and 1 mg versed IV to help with pain.  Also given hematoma block with 1% lidocaine and no epi administered by orthopedic resident.  Patient tolerated the procedure well and post imaging looked improved mortise and good alignment.   Patient Tolerance:  Patient tolerated the procedure well with no immediate complications               Results for orders placed or performed during the hospital encounter of 08/21/23   Ankle XR, G/E 3 views, right     Status: None    Narrative    EXAM: XR ANKLE RIGHT G/E 3 VIEWS  LOCATION: [facility]  DATE: 8/21/2023    INDICATION: pain, swelling, trauma  COMPARISON: None.      Impression    IMPRESSION: Mildly displaced fracture the distal fibula. Soft tissue edema. Slight medial widening of ankle mortise not excluded.   Foot  XR, G/E 3 views, right     Status: None    Narrative    EXAM: XR FOOT RIGHT G/E 3 VIEWS  LOCATION: [facility]  DATE: 8/21/2023    INDICATION: pain and trauma.  COMPARISON: None.      Impression    IMPRESSION: Fracture of the distal fibula. No visible fracture or dislocation of the foot.   Ankle XR, G/E 3 views, right     Status: None    Narrative    EXAM: XR ANKLE RIGHT G/E 3 VIEWS  LOCATION: [facility]  DATE:  8/22/2023    INDICATION: Right ankle fracture. Post splint imaging.  COMPARISON: X-ray right ankle 3 views 8/21/2023 at 2210 hours.      Impression    IMPRESSION: Interval placement of splint for stabilization of mildly comminuted distal right fibular fracture. Improved asymmetric widening of the ankle mortise medially demonstrated previously. Soft tissue swelling.     Medications   HYDROmorphone (PF) (DILAUDID) injection 0.5 mg (0.5 mg Intravenous $Given 8/21/23 2126)   HYDROmorphone (PF) (DILAUDID) injection 0.5 mg (0.5 mg Intravenous $Given 8/21/23 2219)   midazolam (VERSED) injection 1-2 mg (1 mg Intravenous $Given 8/21/23 2322)   HYDROcodone-acetaminophen (NORCO) 5-325 MG per tablet 1-2 tablet (1 tablet Oral $Given 8/22/23 0033)   HYDROcodone-acetaminophen (NORCO) 5-325 MG per tablet 1 tablet (1 tablet Oral $Given 8/22/23 0058)     Labs Ordered and Resulted from Time of ED Arrival to Time of ED Departure - No data to display  Ankle XR, G/E 3 views, right   Final Result   IMPRESSION: Interval placement of splint for stabilization of mildly comminuted distal right fibular fracture. Improved asymmetric widening of the ankle mortise medially demonstrated previously. Soft tissue swelling.      Ankle XR, G/E 3 views, right   Final Result   IMPRESSION: Mildly displaced fracture the distal fibula. Soft tissue edema. Slight medial widening of ankle mortise not excluded.      Foot  XR, G/E 3 views, right   Final Result   IMPRESSION: Fracture of the distal fibula. No visible fracture or dislocation of the foot.             Critical care was not performed.     Medical Decision Making  The patient's presentation was of moderate complexity (an acute complicated injury).    The patient's evaluation involved:  an assessment requiring an independent historian (mother)  independent interpretation of testing performed by another health professional (ankle/foot xrays pre splint and post splint)  discussion of management or test  interpretation with another health professional (orthopedics)    The patient's management necessitated high risk (a parenteral controlled substance).    Assessment & Plan    The patient presents with sudden onset right ankle pain after falling while skateboarding he denies other injury. He says he feels numb in the lower leg.  Pulses intact.  He has right ankle swelling.  He requested I cut the shoelaces and his sock to remove his shoe and sock. This was done. He was given dilaudid 0.5 mg IV. Another dose of dilaudid 0.5 mg IV given. Xrays of right foot and ankle ordered.   Imaging reviewed and shows widened mortise with fibular fracture. Pulses and sensation intact.   Spoke with orthopedics who came to the ED and placed short legged rj after given a hematoma block.  Patient was given versed 1 mg IV prior to splint placement and remained awake but more relaxed during the procedure. No airway concerns.  Post splint xray shows improvement. He was given crutches and non weightbearing. He was given 1 vicodin prior to discharge but due to pain was given a second pill. He was discharged with mother.  Close orthopedic follow up with [facility] referral.  Vicodin for pain prescribed via [facility].     I have reviewed the nursing notes. I have reviewed the findings, diagnosis, plan and need for follow up with the patient.    Discharge Medication List as of 8/22/2023 12:39 AM        START taking these medications    Details   HYDROcodone-acetaminophen (NORCO) 5-325 MG tablet Take 1 tablet by mouth every 6 hours as needed for pain, Disp-15 tablet, R-0, [facility]             Final diagnoses:   Closed fracture of distal end of right fibula, unspecified fracture morphology, initial encounter - with widened mortise       Yvette Brewer MD  [facility] EMERGENCY DEPARTMENT  8/21/2023     Yvette Brewer MD  08/22/23 0987

## 2023-12-22 ENCOUNTER — HOSPITAL ENCOUNTER (EMERGENCY)
Facility: CLINIC | Age: 23
Discharge: HOME | End: 2023-12-22
Attending: EMERGENCY MEDICINE | Admitting: EMERGENCY MEDICINE
Payer: COMMERCIAL

## 2023-12-22 VITALS
TEMPERATURE: 97.8 F | HEART RATE: 55 BPM | OXYGEN SATURATION: 99 % | DIASTOLIC BLOOD PRESSURE: 55 MMHG | SYSTOLIC BLOOD PRESSURE: 106 MMHG | RESPIRATION RATE: 20 BRPM

## 2023-12-22 DIAGNOSIS — R11.15 CYCLICAL VOMITING: Primary | ICD-10-CM

## 2023-12-22 LAB
ALBUMIN SERPL BCG-MCNC: 4.5 G/DL (ref 3.5–5.2)
ALP SERPL-CCNC: 97 U/L (ref 40–150)
ALT SERPL W P-5'-P-CCNC: 19 U/L (ref 0–70)
ANION GAP SERPL CALCULATED.3IONS-SCNC: 14 MMOL/L (ref 7–15)
AST SERPL W P-5'-P-CCNC: 30 U/L (ref 0–45)
BASOPHILS # BLD AUTO: 0 10E3/UL (ref 0–0.2)
BASOPHILS NFR BLD AUTO: 0 %
BILIRUB SERPL-MCNC: 0.8 MG/DL
BUN SERPL-MCNC: 17.7 MG/DL (ref 6–20)
CALCIUM SERPL-MCNC: 9.7 MG/DL (ref 8.6–10)
CHLORIDE SERPL-SCNC: 106 MMOL/L (ref 98–107)
CREAT SERPL-MCNC: 0.9 MG/DL (ref 0.67–1.17)
DEPRECATED HCO3 PLAS-SCNC: 21 MMOL/L (ref 22–29)
EGFRCR SERPLBLD CKD-EPI 2021: >90 ML/MIN/1.73M2
EOSINOPHIL # BLD AUTO: 0.1 10E3/UL (ref 0–0.7)
EOSINOPHIL NFR BLD AUTO: 1 %
ERYTHROCYTE [DISTWIDTH] IN BLOOD BY AUTOMATED COUNT: 11.9 % (ref 10–15)
FLUAV RNA SPEC QL NAA+PROBE: NEGATIVE
FLUBV RNA RESP QL NAA+PROBE: NEGATIVE
GLUCOSE SERPL-MCNC: 148 MG/DL (ref 70–99)
HCT VFR BLD AUTO: 40.8 % (ref 40–53)
HGB BLD-MCNC: 13.8 G/DL (ref 13.3–17.7)
IMM GRANULOCYTES # BLD: 0 10E3/UL
IMM GRANULOCYTES NFR BLD: 0 %
LIPASE SERPL-CCNC: 21 U/L (ref 13–60)
LYMPHOCYTES # BLD AUTO: 0.6 10E3/UL (ref 0.8–5.3)
LYMPHOCYTES NFR BLD AUTO: 5 %
MCH RBC QN AUTO: 29.8 PG (ref 26.5–33)
MCHC RBC AUTO-ENTMCNC: 33.8 G/DL (ref 31.5–36.5)
MCV RBC AUTO: 88 FL (ref 78–100)
MONOCYTES # BLD AUTO: 0.3 10E3/UL (ref 0–1.3)
MONOCYTES NFR BLD AUTO: 3 %
NEUTROPHILS # BLD AUTO: 9.8 10E3/UL (ref 1.6–8.3)
NEUTROPHILS NFR BLD AUTO: 91 %
NRBC # BLD AUTO: 0 10E3/UL
NRBC BLD AUTO-RTO: 0 /100
PLATELET # BLD AUTO: 234 10E3/UL (ref 150–450)
POTASSIUM SERPL-SCNC: 4.6 MMOL/L (ref 3.4–5.3)
PROT SERPL-MCNC: 7.3 G/DL (ref 6.4–8.3)
RBC # BLD AUTO: 4.63 10E6/UL (ref 4.4–5.9)
RSV RNA SPEC NAA+PROBE: NEGATIVE
SARS-COV-2 RNA RESP QL NAA+PROBE: NEGATIVE
SODIUM SERPL-SCNC: 141 MMOL/L (ref 135–145)
WBC # BLD AUTO: 10.8 10E3/UL (ref 4–11)

## 2023-12-22 PROCEDURE — 258N000003 HC RX IP 258 OP 636: Performed by: EMERGENCY MEDICINE

## 2023-12-22 PROCEDURE — 85025 COMPLETE CBC W/AUTO DIFF WBC: CPT | Performed by: EMERGENCY MEDICINE

## 2023-12-22 PROCEDURE — 96376 TX/PRO/DX INJ SAME DRUG ADON: CPT

## 2023-12-22 PROCEDURE — 99284 EMERGENCY DEPT VISIT MOD MDM: CPT | Mod: 25

## 2023-12-22 PROCEDURE — 250N000009 HC RX 250

## 2023-12-22 PROCEDURE — 36415 COLL VENOUS BLD VENIPUNCTURE: CPT | Performed by: EMERGENCY MEDICINE

## 2023-12-22 PROCEDURE — 250N000011 HC RX IP 250 OP 636: Mod: JZ | Performed by: EMERGENCY MEDICINE

## 2023-12-22 PROCEDURE — 83690 ASSAY OF LIPASE: CPT | Performed by: EMERGENCY MEDICINE

## 2023-12-22 PROCEDURE — 96361 HYDRATE IV INFUSION ADD-ON: CPT

## 2023-12-22 PROCEDURE — 80053 COMPREHEN METABOLIC PANEL: CPT | Performed by: EMERGENCY MEDICINE

## 2023-12-22 PROCEDURE — 87637 SARSCOV2&INF A&B&RSV AMP PRB: CPT | Performed by: EMERGENCY MEDICINE

## 2023-12-22 PROCEDURE — 96375 TX/PRO/DX INJ NEW DRUG ADDON: CPT

## 2023-12-22 PROCEDURE — 96374 THER/PROPH/DIAG INJ IV PUSH: CPT

## 2023-12-22 RX ORDER — OLANZAPINE 10 MG/1
2.5 INJECTION, POWDER, LYOPHILIZED, FOR SOLUTION INTRAMUSCULAR EVERY 30 MIN PRN
Status: COMPLETED | OUTPATIENT
Start: 2023-12-22 | End: 2023-12-22

## 2023-12-22 RX ORDER — KETOROLAC TROMETHAMINE 15 MG/ML
15 INJECTION, SOLUTION INTRAMUSCULAR; INTRAVENOUS ONCE
Status: COMPLETED | OUTPATIENT
Start: 2023-12-22 | End: 2023-12-22

## 2023-12-22 RX ORDER — WATER 10 ML/10ML
INJECTION INTRAMUSCULAR; INTRAVENOUS; SUBCUTANEOUS
Status: COMPLETED
Start: 2023-12-22 | End: 2023-12-22

## 2023-12-22 RX ORDER — ONDANSETRON 4 MG/1
4 TABLET, ORALLY DISINTEGRATING ORAL EVERY 8 HOURS PRN
Qty: 10 TABLET | Refills: 0 | Status: SHIPPED | OUTPATIENT
Start: 2023-12-22 | End: 2024-07-26

## 2023-12-22 RX ADMIN — OLANZAPINE 2.5 MG: 10 INJECTION, POWDER, FOR SOLUTION INTRAMUSCULAR at 05:14

## 2023-12-22 RX ADMIN — KETOROLAC TROMETHAMINE 15 MG: 15 INJECTION, SOLUTION INTRAMUSCULAR; INTRAVENOUS at 06:11

## 2023-12-22 RX ADMIN — OLANZAPINE 2.5 MG: 10 INJECTION, POWDER, FOR SOLUTION INTRAMUSCULAR at 05:47

## 2023-12-22 RX ADMIN — WATER 10 ML: 1 INJECTION INTRAMUSCULAR; INTRAVENOUS; SUBCUTANEOUS at 05:14

## 2023-12-22 RX ADMIN — SODIUM CHLORIDE 1000 ML: 9 INJECTION, SOLUTION INTRAVENOUS at 05:05

## 2023-12-22 ASSESSMENT — ACTIVITIES OF DAILY LIVING (ADL)
ADLS_ACUITY_SCORE: 35
ADLS_ACUITY_SCORE: 35

## 2023-12-22 NOTE — ED PROVIDER NOTES
History     Chief Complaint:  Nausea, Vomiting, & Diarrhea       The history is provided by the patient.      Zack Simmons is a 23 year old male with history of hematemesis who presents to the ED with nausea and vomiting. Patient reports he started to feel nauseous at work yesterday at around 2100 and began vomiting. He notes he had spit out some blood as well. Additional symptoms include shortness of breath, chest pain, sweating and hot flashes. He notes that he had taken marijuana the day before. Patient works at a [employer]. Denies diarrhea and genital pain. Denies alcohol use and abdominal surgery.    Independent Historian:   Parent - They report this is the 4th hospital visit of vomiting and shaking. Notes there is sometimes blood in vomit. Previous providers had suspected ulcer and had diagnosed with gastritis.     Review of External Notes:   I reviewed the ED visit note on 3/6/2022, noting hematemesis.     Medications:    Norco  Ibuprofen   Zofran    Past Medical History:    Concussion  Enuresis  Fibula fracture  Migraine  Nocturnal enuresis  Seasonal allergies  Traumatic brain injury  Closed fracture of nasal bone  Vitamin D deficiency     Past Surgical History:    Orthopedic surgery, left wrist    Physical Exam   Patient Vitals for the past 24 hrs:   BP Temp Temp src Pulse Resp SpO2   12/22/23 0521 -- -- -- -- 20 --   12/22/23 0500 107/56 97.8  F (36.6  C) Oral 57 -- 97 %      Physical Exam  General: Alert, appears well-developed and well-nourished. Cooperative.     In moderate distress.  Actively dry heaving.    HEENT:  Head:  Atraumatic  Ears:  External ears are normal  Mouth/Throat:  Oropharynx is without erythema or exudate and mucous membranes are moist.   Eyes:   Conjunctivae normal and EOM are normal. No scleral icterus.  CV:  Normal rate, regular rhythm, normal heart sounds and radial pulses are 2+ and symmetric.  No murmur.  Resp:  Breath sounds are clear bilaterally    Non-labored, no  retractions or accessory muscle use  GI:  Abdomen is soft, no distension, no tenderness. No rebound or guarding.  No CVA tenderness bilaterally  MS:  Normal range of motion. No edema.    Normal strength in all 4 extremities.     Back atraumatic.    No midline cervical, thoracic, or lumbar tenderness  Skin:  Warm and dry.  No rash or lesions noted.  Neuro:   Alert. Normal strength.  GCS: 15  Psych: Normal mood and affect.    Emergency Department Course   Laboratory:  Labs Ordered and Resulted from Time of ED Arrival to Time of ED Departure   COMPREHENSIVE METABOLIC PANEL - Abnormal       Result Value    Sodium 141      Potassium 4.6      Carbon Dioxide (CO2) 21 (*)     Anion Gap 14      Urea Nitrogen 17.7      Creatinine 0.90      GFR Estimate >90      Calcium 9.7      Chloride 106      Glucose 148 (*)     Alkaline Phosphatase 97      AST 30      ALT 19      Protein Total 7.3      Albumin 4.5      Bilirubin Total 0.8     CBC WITH PLATELETS AND DIFFERENTIAL - Abnormal    WBC Count 10.8      RBC Count 4.63      Hemoglobin 13.8      Hematocrit 40.8      MCV 88      MCH 29.8      MCHC 33.8      RDW 11.9      Platelet Count 234      % Neutrophils 91      % Lymphocytes 5      % Monocytes 3      % Eosinophils 1      % Basophils 0      % Immature Granulocytes 0      NRBCs per 100 WBC 0      Absolute Neutrophils 9.8 (*)     Absolute Lymphocytes 0.6 (*)     Absolute Monocytes 0.3      Absolute Eosinophils 0.1      Absolute Basophils 0.0      Absolute Immature Granulocytes 0.0      Absolute NRBCs 0.0     LIPASE - Normal    Lipase 21     INFLUENZA A/B, RSV, & SARS-COV2 PCR - Normal    Influenza A PCR Negative      Influenza B PCR Negative      RSV PCR Negative      SARS CoV2 PCR Negative          Emergency Department Course & Assessments:    Interventions:  Medications   sodium chloride 0.9% BOLUS 1,000 mL (0 mLs Intravenous Stopped 12/22/23 0654)   OLANZapine (zyPREXA) IV injection 2.5 mg (2.5 mg Intravenous $Given 12/22/23  5966)   sterile water (preservative free) injection (10 mLs  $Given 12/22/23 0514)   ketorolac (TORADOL) injection 15 mg (15 mg Intravenous $Given 12/22/23 0611)      Assessments:  0458 I obtained the history and examined the patient as noted above.  0648 I rechecked and updated the patient. Patient passed a PO challenge and is comfortable with discharge plan.    Independent Interpretation (X-rays, CTs, rhythm strip):  None    Consultations/Discussion of Management or Tests:  None     Social Determinants of Health affecting care:   None    Disposition:  The patient was discharged to home.     Impression & Plan    Medical Decision Making:  Zack Simmons is a 23 year old male with a history of cyclical vomiting syndrome who presents to the ED with vomiting.  Symptoms are similar to prior episodes of cyclic vomiting.  I have very low suspicion of appendicitis or cholecystitis nor any other intraabdominal emergency.   Lab work is reassuring.  I doubt pancreatitis, UTI, pyelonephritis.  His exam is benign upon presentation and at discharge, including abdominal exam.   On reevaluation vomiting resolved and tolerated ice chips, and so I think he is safe for discharge home.  He is to follow up with primary care physician in 1-2 days and return here if worsening.    Diagnosis:    ICD-10-CM    1. Cyclical vomiting  R11.15            Discharge Medications:  New Prescriptions    OMEPRAZOLE (PRILOSEC) 20 MG DR CAPSULE    Take 1 capsule (20 mg) by mouth daily for 30 days    ONDANSETRON (ZOFRAN ODT) 4 MG ODT TAB    Take 1 tablet (4 mg) by mouth every 8 hours as needed for nausea or vomiting        Scribe Disclosure:  I, Alfonso Sultana, am serving as a scribe at 5:05 AM on 12/22/2023 to document services personally performed by Delfino Fischer MD based on my observations and the provider's statements to me.     12/22/2023   Delfino Fischer MD White, Scott, MD  12/22/23 2277

## 2023-12-22 NOTE — ED TRIAGE NOTES
[facility]  ED Arrival Note    Arrived to ED via EMS from home C/o vomiting.  Reportedly pt has had multiple episodes of vomiting since 10 pm last night. Pt stated that he ate a meal but unsure if that is the cause of his symptoms. Pt staes that he had x1 episode of diarrhea and has been experiencing chills as well.  En route EMS gave 4 MG Zofran IVP and 500 ml NS bolus.      Visitors during triage: Mother      Directed to: Main ED    Pronouns: he/him       Triage Assessment (Adult)       Row Name 12/22/23 0449          Respiratory WDL    Respiratory WDL WDL;rhythm/pattern     Rhythm/Pattern, Respiratory depth regular;pattern regular        Cardiac WDL    Cardiac WDL WDL        Peripheral/Neurovascular WDL    Peripheral Neurovascular WDL WDL        Cognitive/Neuro/Behavioral WDL    Cognitive/Neuro/Behavioral WDL WDL

## 2024-01-28 ENCOUNTER — HOSPITAL ENCOUNTER (EMERGENCY)
Facility: CLINIC | Age: 24
Discharge: HOME | End: 2024-01-28
Attending: EMERGENCY MEDICINE | Admitting: EMERGENCY MEDICINE
Payer: COMMERCIAL

## 2024-01-28 VITALS
WEIGHT: 160 LBS | HEIGHT: 69 IN | DIASTOLIC BLOOD PRESSURE: 59 MMHG | SYSTOLIC BLOOD PRESSURE: 109 MMHG | TEMPERATURE: 97 F | BODY MASS INDEX: 23.7 KG/M2 | HEART RATE: 52 BPM | OXYGEN SATURATION: 100 % | RESPIRATION RATE: 18 BRPM

## 2024-01-28 DIAGNOSIS — R11.15 CYCLICAL VOMITING WITH NAUSEA: ICD-10-CM

## 2024-01-28 DIAGNOSIS — F12.10 CANNABIS ABUSE, CONTINUOUS: ICD-10-CM

## 2024-01-28 LAB
ALBUMIN SERPL BCG-MCNC: 4.8 G/DL (ref 3.5–5.2)
ALP SERPL-CCNC: 103 U/L (ref 40–150)
ALT SERPL W P-5'-P-CCNC: 22 U/L (ref 0–70)
ANION GAP SERPL CALCULATED.3IONS-SCNC: 10 MMOL/L (ref 7–15)
AST SERPL W P-5'-P-CCNC: 31 U/L (ref 0–45)
BASOPHILS # BLD AUTO: 0 10E3/UL (ref 0–0.2)
BASOPHILS NFR BLD AUTO: 0 %
BILIRUB DIRECT SERPL-MCNC: <0.2 MG/DL (ref 0–0.3)
BILIRUB SERPL-MCNC: 0.7 MG/DL
BUN SERPL-MCNC: 14.8 MG/DL (ref 6–20)
CALCIUM SERPL-MCNC: 10.1 MG/DL (ref 8.6–10)
CHLORIDE SERPL-SCNC: 105 MMOL/L (ref 98–107)
CREAT SERPL-MCNC: 0.95 MG/DL (ref 0.67–1.17)
DEPRECATED HCO3 PLAS-SCNC: 25 MMOL/L (ref 22–29)
EGFRCR SERPLBLD CKD-EPI 2021: >90 ML/MIN/1.73M2
EOSINOPHIL # BLD AUTO: 0.1 10E3/UL (ref 0–0.7)
EOSINOPHIL NFR BLD AUTO: 1 %
ERYTHROCYTE [DISTWIDTH] IN BLOOD BY AUTOMATED COUNT: 12.1 % (ref 10–15)
GLUCOSE SERPL-MCNC: 130 MG/DL (ref 70–99)
HCT VFR BLD AUTO: 44 % (ref 40–53)
HGB BLD-MCNC: 14.7 G/DL (ref 13.3–17.7)
IMM GRANULOCYTES # BLD: 0.1 10E3/UL
IMM GRANULOCYTES NFR BLD: 1 %
LIPASE SERPL-CCNC: 29 U/L (ref 13–60)
LYMPHOCYTES # BLD AUTO: 0.5 10E3/UL (ref 0.8–5.3)
LYMPHOCYTES NFR BLD AUTO: 3 %
MCH RBC QN AUTO: 29.5 PG (ref 26.5–33)
MCHC RBC AUTO-ENTMCNC: 33.4 G/DL (ref 31.5–36.5)
MCV RBC AUTO: 88 FL (ref 78–100)
MONOCYTES # BLD AUTO: 0.5 10E3/UL (ref 0–1.3)
MONOCYTES NFR BLD AUTO: 3 %
NEUTROPHILS # BLD AUTO: 14 10E3/UL (ref 1.6–8.3)
NEUTROPHILS NFR BLD AUTO: 92 %
NRBC # BLD AUTO: 0 10E3/UL
NRBC BLD AUTO-RTO: 0 /100
PLATELET # BLD AUTO: 237 10E3/UL (ref 150–450)
POTASSIUM SERPL-SCNC: 5.1 MMOL/L (ref 3.4–5.3)
PROT SERPL-MCNC: 7.9 G/DL (ref 6.4–8.3)
RBC # BLD AUTO: 4.98 10E6/UL (ref 4.4–5.9)
SODIUM SERPL-SCNC: 140 MMOL/L (ref 135–145)
WBC # BLD AUTO: 15.2 10E3/UL (ref 4–11)

## 2024-01-28 PROCEDURE — 99284 EMERGENCY DEPT VISIT MOD MDM: CPT | Mod: 25

## 2024-01-28 PROCEDURE — 82248 BILIRUBIN DIRECT: CPT | Performed by: EMERGENCY MEDICINE

## 2024-01-28 PROCEDURE — 96374 THER/PROPH/DIAG INJ IV PUSH: CPT

## 2024-01-28 PROCEDURE — 80053 COMPREHEN METABOLIC PANEL: CPT | Performed by: EMERGENCY MEDICINE

## 2024-01-28 PROCEDURE — 96361 HYDRATE IV INFUSION ADD-ON: CPT

## 2024-01-28 PROCEDURE — 36415 COLL VENOUS BLD VENIPUNCTURE: CPT | Performed by: EMERGENCY MEDICINE

## 2024-01-28 PROCEDURE — 85025 COMPLETE CBC W/AUTO DIFF WBC: CPT | Performed by: EMERGENCY MEDICINE

## 2024-01-28 PROCEDURE — 258N000003 HC RX IP 258 OP 636: Performed by: EMERGENCY MEDICINE

## 2024-01-28 PROCEDURE — 80048 BASIC METABOLIC PNL TOTAL CA: CPT | Performed by: EMERGENCY MEDICINE

## 2024-01-28 PROCEDURE — 250N000011 HC RX IP 250 OP 636: Performed by: EMERGENCY MEDICINE

## 2024-01-28 PROCEDURE — 83690 ASSAY OF LIPASE: CPT | Performed by: EMERGENCY MEDICINE

## 2024-01-28 RX ORDER — ONDANSETRON 4 MG/1
4 TABLET, ORALLY DISINTEGRATING ORAL EVERY 8 HOURS PRN
Qty: 12 TABLET | Refills: 0 | Status: CANCELLED | OUTPATIENT
Start: 2024-01-28

## 2024-01-28 RX ORDER — ONDANSETRON 2 MG/ML
4 INJECTION INTRAMUSCULAR; INTRAVENOUS ONCE
Status: COMPLETED | OUTPATIENT
Start: 2024-01-28 | End: 2024-01-28

## 2024-01-28 RX ADMIN — SODIUM CHLORIDE 1000 ML: 9 INJECTION, SOLUTION INTRAVENOUS at 12:02

## 2024-01-28 RX ADMIN — ONDANSETRON 4 MG: 2 INJECTION INTRAMUSCULAR; INTRAVENOUS at 12:02

## 2024-01-28 ASSESSMENT — ACTIVITIES OF DAILY LIVING (ADL): ADLS_ACUITY_SCORE: 33

## 2024-01-28 NOTE — ED TRIAGE NOTES
Sudden onset of nausea vomiting with abdominal pain. Report last use of  Marijuana yesterday.      Triage Assessment (Adult)       Row Name 01/28/24 1153          Triage Assessment    Airway WDL WDL        Respiratory WDL    Respiratory WDL WDL        Skin Circulation/Temperature WDL    Skin Circulation/Temperature WDL WDL        Cardiac WDL    Cardiac WDL WDL        Cognitive/Neuro/Behavioral WDL    Cognitive/Neuro/Behavioral WDL WDL

## 2024-01-28 NOTE — DISCHARGE INSTRUCTIONS
I sent a prescription for Zofran over to your pharmacy at [facility].  Please consider taking a pause from your daily cannabis use as this will dramatically help your cyclic nausea and vomiting syndrome.  Consider following up with gastroenterology if symptoms continue.

## 2024-01-28 NOTE — ED NOTES
Patient's mother reports patient is feeling better since given Zofran IV and NS IVF. Wants to take patient home on GI referral.

## 2024-01-28 NOTE — ED PROVIDER NOTES
"  History     Chief Complaint:  Abdominal Pain and Nausea & Vomiting    The history is provided by the patient and a parent.     Zack Simmons is a 23 year old male who presents to the ED with abdominal pain, nausea, and vomiting. Zack states that he woke up at 0600 this morning with nausea and immediate persistent vomiting, reporting ten episodes of vomiting throughout the morning. He also notes abdominal cramping. His mother adds that he has a history of at least five similar episodes of cyclical nausea and vomiting that typically last several hours and include some hematemesis, most recently in December. She states that the episodes are only relieved with IV medication, noting that he vomited up sublingual Zofran immediately this morning. Zack reports daily cannabis use.    Independent Historian:   The patient's mother supplements and endorses the above history.    Review of External Notes:      Allergies:  Birch trees  Cats    Listed Medications:    Zofran  Vistaril  Protonix    Past Medical and Surgical History:    Closed fracture of nasal bone  Concussion  Fibula fracture  Migraine  TBI    Orthopedic surgery, left wrist    Family History:    Diabetes in his maternal grandfather and paternal grandfather    Social History:  Reports that he has never smoked. He has never used smokeless tobacco. He reports that he does not currently use drugs after having used the following drugs: Marijuana. He reports that he does not drink alcohol.    Physical Exam   Patient Vitals for the past 24 hrs:   BP Temp Temp src Pulse Resp SpO2 Height Weight   01/28/24 1153 109/59 97  F (36.1  C) Temporal 52 18 100 % 1.753 m (5' 9\") 72.6 kg (160 lb)     General: Resting uncomfortably in a chair in triage  Head:  The scalp, face, and head appear normal  Eyes:  The pupils are equal, round, and reactive to light    There is no nystagmus    Extraocular muscles are intact    Conjunctivae and sclerae are normal  ENT:    The nose is " normal    Pinnae are normal    The oropharynx is normal  Neck:  Normal range of motion    There is no rigidity noted  CV:  Regular rate  Normal underlying rhythm     Normal S1/S2    No pathological murmur detected  Resp:  Lungs are clear    There is no tachypnea    Non-labored    No rales    No wheezing   GI:  Abdomen is soft, there is no rigidity    No distension/tympani    No rebound tenderness     Non-surgical without peritoneal features at this time    Non-focal and benign at this time  MS:  Normal muscular tone    Symmetric motor strength    No major joint effusions    No asymmetric leg swelling, no calf tenderness  Skin:  No rash or acute skin lesions noted  Neuro:  Speech is normal and fluent, there is no aphasia    No motor deficits    Cranial nerves are intact  Psych: Awake. Alert.      Normal affect.  Appropriate interactions.    Emergency Department Course   Laboratory:  Labs Ordered and Resulted from Time of ED Arrival to Time of ED Departure   BASIC METABOLIC PANEL - Abnormal       Result Value    Sodium 140      Potassium 5.1      Chloride 105      Carbon Dioxide (CO2) 25      Anion Gap 10      Urea Nitrogen 14.8      Creatinine 0.95      GFR Estimate >90      Calcium 10.1 (*)     Glucose 130 (*)    CBC WITH PLATELETS AND DIFFERENTIAL - Abnormal    WBC Count 15.2 (*)     RBC Count 4.98      Hemoglobin 14.7      Hematocrit 44.0      MCV 88      MCH 29.5      MCHC 33.4      RDW 12.1      Platelet Count 237      % Neutrophils 92      % Lymphocytes 3      % Monocytes 3      % Eosinophils 1      % Basophils 0      % Immature Granulocytes 1      NRBCs per 100 WBC 0      Absolute Neutrophils 14.0 (*)     Absolute Lymphocytes 0.5 (*)     Absolute Monocytes 0.5      Absolute Eosinophils 0.1      Absolute Basophils 0.0      Absolute Immature Granulocytes 0.1      Absolute NRBCs 0.0     LIPASE - Normal    Lipase 29     HEPATIC FUNCTION PANEL     Emergency Department Course & Assessments:       Interventions/ED  Medications:  Medications   ondansetron (ZOFRAN) injection 4 mg (4 mg Intravenous $Given 1/28/24 1202)   sodium chloride 0.9% BOLUS 1,000 mL (0 mLs Intravenous Stopped 1/28/24 1258)     Independent Interpretation of Radiology Studies by Dr. Quintero  None.    Assessments/Consultations/Discussion of Management:  ED Course as of 01/28/24 1310   Sun Jan 28, 2024   1207 I obtained history and examined the patient as noted above.    1:05 PM  The patient felt better after IV fluid and intravenous Zofran and requested to leave.  Given massive boarding and overcrowding in the emergency department the patient was still in the waiting room when he was feeling better.  He requested a prescription for the Zofran and advised the nurse that he would like to leave.  He does have a leukocytosis with left shift.  He did not have any specific abdominal pain in the right lower quadrant to suspect appendicitis or other well localized or focal infection in the abdomen.  The differential diagnosis includes cannabis hyperemesis syndrome and acute gastroenteritis.  Peptic ulcer disease, cholecystitis, appendicitis, diverticulitis, and bowel obstruction are in the differential diagnosis but much less likely.  The patient will be prescribed Zofran at this time, and he is encouraged to suspend his use of cannabis for a period of time to let his body recover from the chronic use.      Disposition:  The patient was discharged to home.     Impression & Plan    Medical Decision Making:  This patient presents to the emergency department with acute onset of abdominal cramps along with nausea and vomiting.  He has had numerous episodes in the past.  He has been to the hospital on several occasions.  The patient uses cannabis every day.  He has no history of abdominal surgery.  The patient received intravenous fluids and antiemetics with Zofran IV and felt completely resolved.  He asked to leave prior to getting a dose of Compazine intravenously.  He did  have a leukocytosis with left shift.  This could be from stress demargination or could represent an early infectious or inflammatory process.  The patient does not have any focal or well localized pain in the abdomen whatsoever.  There is specifically no pain in the right upper quadrant right lower quadrant or left lower quadrant.  There is no urinary symptoms.  The most likely etiology is cannabis hyperemesis syndrome given that the patient has had multiple episodes similar to this in the past, acute gastroenteritis could have a similar presentation.      Diagnosis:    ICD-10-CM    1. Cyclical vomiting with nausea  R11.15       2. Cannabis abuse, continuous  F12.10         Discharge Medications (if applicable):  Discharge Medication List as of 1/28/2024  1:11 PM      Zofran was prescribed and sent to the [facility] pharmacy      Scribe Disclosure:  I, Kenna Grijalva, am serving as a scribe at 1:01 PM on 1/28/2024 to document services personally performed by Aram Quintero MD based on my observations and the provider's statements to me.   1/28/2024   Aram Quintero MD Rock, Michael P, MD  01/28/24 3407

## 2024-01-29 ENCOUNTER — OFFICE VISIT (OUTPATIENT)
Dept: INTERNAL MEDICINE | Facility: CLINIC | Age: 24
End: 2024-01-29
Payer: COMMERCIAL

## 2024-01-29 VITALS
SYSTOLIC BLOOD PRESSURE: 110 MMHG | HEIGHT: 69 IN | DIASTOLIC BLOOD PRESSURE: 62 MMHG | OXYGEN SATURATION: 97 % | RESPIRATION RATE: 16 BRPM | HEART RATE: 60 BPM | BODY MASS INDEX: 23.89 KG/M2 | WEIGHT: 161.3 LBS

## 2024-01-29 DIAGNOSIS — R11.15 CYCLICAL VOMITING WITH NAUSEA: Primary | ICD-10-CM

## 2024-01-29 PROCEDURE — 99213 OFFICE O/P EST LOW 20 MIN: CPT | Performed by: PHYSICIAN ASSISTANT

## 2024-01-29 NOTE — PROGRESS NOTES
Assessment & Plan     Cyclical vomiting with nausea  Reviewed labs done yesterday  Referral done     - Adult GI  Referral - Consult Only; Future          MED REC REQUIRED  Post Medication Reconciliation Status:     Nicotine/Tobacco Cessation  He reports that he has been smoking cigarettes. He has never used smokeless tobacco.  Nicotine/Tobacco Cessation Plan  Per PCP          Subjective   Zack is a 23 year old, presenting for the following health issues:  ER F/U    History of Present Illness       Reason for visit:  [place]om[place] issues    He eats 0-1 servings of fruits and vegetables daily.He consumes 0 sweetened beverage(s) daily.He exercises with enough effort to increase his heart rate 60 or more minutes per day.  He exercises with enough effort to increase his heart rate 5 days per week.   He is taking medications regularly.         ED/UC Followup:    Facility:  [facility]   Date of visit: 1/28/2024  Reason for visit: Vomiting  Current Status:  improved today    This patient presents to the emergency department with acute onset of abdominal cramps along with nausea and vomiting. He has had numerous episodes in the past. He has been to the hospital on several occasions. The patient uses cannabis every day. He has no history of abdominal surgery. The patient received intravenous fluids and antiemetics with Zofran IV and felt completely resolved. He asked to leave prior to getting a dose of Compazine intravenously. He did have a leukocytosis with left shift. This could be from stress demargination or could represent an early infectious or inflammatory process. The patient does not have any focal or well localized pain in the abdomen whatsoever. There is specifically no pain in the right upper quadrant right lower quadrant or left lower quadrant. There is no urinary symptoms. The most likely etiology is cannabis hyperemesis syndrome given that the patient has had multiple episodes similar to this in  "the past, acute gastroenteritis could have a similar presentation.     Needs referral to GI provider    Review of chart shows in 3/2022 Admission and EGD done - see EPIC    Issues with Nausea and then vomiting, intermittently for the past few years                Objective    /62   Pulse 60   Resp 16   Ht 1.753 m (5' 9\")   Wt 73.2 kg (161 lb 4.8 oz)   SpO2 97%   BMI 23.82 kg/m    Body mass index is 23.82 kg/m .  Physical Exam   GENERAL: alert and no distress  RESP: lungs clear to auscultation - no rales, rhonchi or wheezes  CV: regular rates and rhythm  MS: no gross musculoskeletal defects noted, no edema    Admission on 01/28/2024, Discharged on 01/28/2024   Component Date Value Ref Range Status    Sodium 01/28/2024 140  135 - 145 mmol/L Final    Reference intervals for this test were updated on 09/26/2023 to more accurately reflect our healthy population. There may be differences in the flagging of prior results with similar values performed with this method. Interpretation of those prior results can be made in the context of the updated reference intervals.     Potassium 01/28/2024 5.1  3.4 - 5.3 mmol/L Final    Chloride 01/28/2024 105  98 - 107 mmol/L Final    Carbon Dioxide (CO2) 01/28/2024 25  22 - 29 mmol/L Final    Anion Gap 01/28/2024 10  7 - 15 mmol/L Final    Urea Nitrogen 01/28/2024 14.8  6.0 - 20.0 mg/dL Final    Creatinine 01/28/2024 0.95  0.67 - 1.17 mg/dL Final    GFR Estimate 01/28/2024 >90  >60 mL/min/1.73m2 Final    Calcium 01/28/2024 10.1 (H)  8.6 - 10.0 mg/dL Final    Glucose 01/28/2024 130 (H)  70 - 99 mg/dL Final    WBC Count 01/28/2024 15.2 (H)  4.0 - 11.0 10e3/uL Final    RBC Count 01/28/2024 4.98  4.40 - 5.90 10e6/uL Final    Hemoglobin 01/28/2024 14.7  13.3 - 17.7 g/dL Final    Hematocrit 01/28/2024 44.0  40.0 - 53.0 % Final    MCV 01/28/2024 88  78 - 100 fL Final    MCH 01/28/2024 29.5  26.5 - 33.0 pg Final    MCHC 01/28/2024 33.4  31.5 - 36.5 g/dL Final    RDW 01/28/2024 12.1 "  10.0 - 15.0 % Final    Platelet Count 01/28/2024 237  150 - 450 10e3/uL Final    % Neutrophils 01/28/2024 92  % Final    % Lymphocytes 01/28/2024 3  % Final    % Monocytes 01/28/2024 3  % Final    % Eosinophils 01/28/2024 1  % Final    % Basophils 01/28/2024 0  % Final    % Immature Granulocytes 01/28/2024 1  % Final    NRBCs per 100 WBC 01/28/2024 0  <1 /100 Final    Absolute Neutrophils 01/28/2024 14.0 (H)  1.6 - 8.3 10e3/uL Final    Absolute Lymphocytes 01/28/2024 0.5 (L)  0.8 - 5.3 10e3/uL Final    Absolute Monocytes 01/28/2024 0.5  0.0 - 1.3 10e3/uL Final    Absolute Eosinophils 01/28/2024 0.1  0.0 - 0.7 10e3/uL Final    Absolute Basophils 01/28/2024 0.0  0.0 - 0.2 10e3/uL Final    Absolute Immature Granulocytes 01/28/2024 0.1  <=0.4 10e3/uL Final    Absolute NRBCs 01/28/2024 0.0  10e3/uL Final    Protein Total 01/28/2024 7.9  6.4 - 8.3 g/dL Final    Albumin 01/28/2024 4.8  3.5 - 5.2 g/dL Final    Bilirubin Total 01/28/2024 0.7  <=1.2 mg/dL Final    Alkaline Phosphatase 01/28/2024 103  40 - 150 U/L Final    Reference intervals for this test were updated on 11/14/2023 to more accurately reflect our healthy population. There may be differences in the flagging of prior results with similar values performed with this method. Interpretation of those prior results can be made in the context of the updated reference intervals.    AST 01/28/2024 31  0 - 45 U/L Final    Reference intervals for this test were updated on 6/12/2023 to more accurately reflect our healthy population. There may be differences in the flagging of prior results with similar values performed with this method. Interpretation of those prior results can be made in the context of the updated reference intervals.    ALT 01/28/2024 22  0 - 70 U/L Final    Reference intervals for this test were updated on 6/12/2023 to more accurately reflect our healthy population. There may be differences in the flagging of prior results with similar values performed  with this method. Interpretation of those prior results can be made in the context of the updated reference intervals.      Bilirubin Direct 01/28/2024 <0.20  0.00 - 0.30 mg/dL Final    Lipase 01/28/2024 29  13 - 60 U/L Final           Signed Electronically by: Mago Lynch PA-C

## 2024-01-30 ENCOUNTER — DOCUMENTATION ONLY (OUTPATIENT)
Dept: GASTROENTEROLOGY | Facility: CLINIC | Age: 24
End: 2024-01-30
Payer: COMMERCIAL

## 2024-01-30 NOTE — PROGRESS NOTES
Called [facility] to request that they fax over medical records pertaining to recent referral.      [facility] Representative noted that they would send an EGD (no pathology) with a consult from 2022.     Clinic Information:  [facility]  Phone #: [phone] extension [phone] sk

## 2024-01-31 ENCOUNTER — TELEPHONE (OUTPATIENT)
Dept: GASTROENTEROLOGY | Facility: CLINIC | Age: 24
End: 2024-01-31
Payer: COMMERCIAL

## 2024-01-31 NOTE — TELEPHONE ENCOUNTER
[facility] Call Center    Phone Message    May a detailed message be left on voicemail: yes     Reason for Call: Other: Pt mother Nona is calling to let team know Pt also started having some upper chest pain and all over feeling unwell they believe is related to his GI issues and wanted team to have this info during triage. Thank you!     Action Taken: Message routed to:  [facility] ([facility] GI    Travel Screening: Not Applicable

## 2024-02-02 NOTE — TELEPHONE ENCOUNTER
Call back to patients Mom Nona who is reporting these symptoms as noted below. Nona will reach out to patient to see if he would like to call to see if there would be an earlier appointment. If patient does not want an earlier appointment he should be seen in the ER if he is having chest pain that is severe and will not go away.   Lucia Sorto RN

## 2024-07-25 ENCOUNTER — HOSPITAL ENCOUNTER (EMERGENCY)
Facility: CLINIC | Age: 24
Discharge: LEFT BEFORE DISCHARGE | End: 2024-07-25
Admitting: EMERGENCY MEDICINE
Payer: COMMERCIAL

## 2024-07-25 ENCOUNTER — HOSPITAL ENCOUNTER (EMERGENCY)
Facility: CLINIC | Age: 24
Discharge: LEFT BEFORE DISCHARGE | End: 2024-07-25
Payer: COMMERCIAL

## 2024-07-25 VITALS
BODY MASS INDEX: 24.59 KG/M2 | RESPIRATION RATE: 16 BRPM | WEIGHT: 166 LBS | OXYGEN SATURATION: 97 % | SYSTOLIC BLOOD PRESSURE: 100 MMHG | HEIGHT: 69 IN | TEMPERATURE: 98.1 F | DIASTOLIC BLOOD PRESSURE: 65 MMHG | HEART RATE: 70 BPM

## 2024-07-25 PROCEDURE — 99281 EMR DPT VST MAYX REQ PHY/QHP: CPT | Performed by: EMERGENCY MEDICINE

## 2024-07-25 ASSESSMENT — COLUMBIA-SUICIDE SEVERITY RATING SCALE - C-SSRS
1. IN THE PAST MONTH, HAVE YOU WISHED YOU WERE DEAD OR WISHED YOU COULD GO TO SLEEP AND NOT WAKE UP?: NO
6. HAVE YOU EVER DONE ANYTHING, STARTED TO DO ANYTHING, OR PREPARED TO DO ANYTHING TO END YOUR LIFE?: NO
2. HAVE YOU ACTUALLY HAD ANY THOUGHTS OF KILLING YOURSELF IN THE PAST MONTH?: NO

## 2024-07-26 ENCOUNTER — DOCUMENTATION ONLY (OUTPATIENT)
Dept: OTHER | Facility: CLINIC | Age: 24
End: 2024-07-26
Payer: COMMERCIAL

## 2024-07-26 ENCOUNTER — APPOINTMENT (OUTPATIENT)
Dept: GENERAL RADIOLOGY | Facility: CLINIC | Age: 24
End: 2024-07-26
Attending: EMERGENCY MEDICINE
Payer: COMMERCIAL

## 2024-07-26 ENCOUNTER — HOSPITAL ENCOUNTER (EMERGENCY)
Facility: CLINIC | Age: 24
Discharge: HOME | End: 2024-07-26
Attending: EMERGENCY MEDICINE | Admitting: EMERGENCY MEDICINE
Payer: COMMERCIAL

## 2024-07-26 VITALS
HEART RATE: 66 BPM | SYSTOLIC BLOOD PRESSURE: 111 MMHG | DIASTOLIC BLOOD PRESSURE: 84 MMHG | OXYGEN SATURATION: 100 % | RESPIRATION RATE: 18 BRPM | TEMPERATURE: 97.4 F

## 2024-07-26 DIAGNOSIS — S63.641A SPRAIN OF METACARPOPHALANGEAL (MCP) JOINT OF RIGHT THUMB, INITIAL ENCOUNTER: ICD-10-CM

## 2024-07-26 PROCEDURE — 99284 EMERGENCY DEPT VISIT MOD MDM: CPT

## 2024-07-26 PROCEDURE — 73140 X-RAY EXAM OF FINGER(S): CPT | Mod: LT

## 2024-07-26 PROCEDURE — 73130 X-RAY EXAM OF HAND: CPT | Mod: RT

## 2024-07-26 PROCEDURE — 72170 X-RAY EXAM OF PELVIS: CPT

## 2024-07-26 ASSESSMENT — ACTIVITIES OF DAILY LIVING (ADL)
ADLS_ACUITY_SCORE: 35
ADLS_ACUITY_SCORE: 35

## 2024-07-26 ASSESSMENT — COLUMBIA-SUICIDE SEVERITY RATING SCALE - C-SSRS
6. HAVE YOU EVER DONE ANYTHING, STARTED TO DO ANYTHING, OR PREPARED TO DO ANYTHING TO END YOUR LIFE?: NO
1. IN THE PAST MONTH, HAVE YOU WISHED YOU WERE DEAD OR WISHED YOU COULD GO TO SLEEP AND NOT WAKE UP?: NO
2. HAVE YOU ACTUALLY HAD ANY THOUGHTS OF KILLING YOURSELF IN THE PAST MONTH?: NO

## 2024-07-26 NOTE — ED PROVIDER NOTES
Emergency Department Note      History of Present Illness     Chief Complaint  Trauma    HPI  Zack Simmons is a 24 year old male was skateboarding last evening and slipped on a rail.  He is right-hand dominant.  He suffered an injury to the right thumb.  He is having pain at the intra phalangeal joint and at the first MCP joint.  There is pain to the head and neck of the first metacarpal.  The second through fifth metacarpals are nontender.  The thenar eminence is nontender.  He does not have any significant pain to the distal forearm bones or to the carpals.  He also noticed that he had a bruise and an abrasion and some tenderness near the right anterior superior iliac spine.    Independent Historian  None    Review of External Notes  None  Past Medical History   Medical History and Problem List  Past Medical History:   Diagnosis Date    Closed fracture of nasal bone 3/24/2017    Concussion 9/17/2014    Enuresis     Fibula fracture 10/9/2013    Migraine     Nocturnal enuresis 10/26/2012    Seasonal allergies     Traumatic brain injury (H) 9/30/2014       Medications  No current outpatient medications on file.      Surgical History   Past Surgical History:   Procedure Laterality Date    NO HISTORY OF SURGERY      ORTHOPEDIC SURGERY  05/2019    left wrist   The patient is status post ORIF of the left scaphoid bone with a pin.    Physical Exam   Patient Vitals for the past 24 hrs:   BP Temp Pulse Resp SpO2   07/26/24 0838 -- -- -- -- 100 %   07/26/24 0837 -- 97.4  F (36.3  C) -- 18 --   07/26/24 0834 111/84 -- 66 -- --     Physical Exam    General: Resting comfortably on the gurney  Head:  The scalp, face, and head appear normal  Eyes:  The pupils are normal    Conjunctivae and sclera appear normal  ENT:    The nose is normal    Ears/pinnae are normal  Neck:  Normal range of motion  MS:  Pelvis: There is a small contusion and abrasion to the right anterior low abdomen right overlying the anterior superior iliac  spine.  No crepitation is noted there.  There is no large hematoma.  The patient's spleen and liver are nontender.  He is able to ambulate.  With regards to the right hand: There is tenderness involving the first MCP joint.  There is tenderness involving the head and neck of the first metacarpal.  There is mild tenderness to the ulnar collateral ligament.  The radial collateral ligament is nontender.  There is no malrotation to the thumb.  There is mild tenderness involving the intra phalangeal joint.  There is no specific tenderness over the scaphoid bone or in the anatomic snuffbox.  Skin:  There is an abrasion and contusion to the right low abdomen overlying the anterior superior iliac spine  Neuro:  Speech is normal and fluent  Psych: Awake. Alert.  Normal affect.      Appropriate interactions          Diagnostics   Lab Results   Labs Ordered and Resulted from Time of ED Arrival to Time of ED Departure - No data to display    Imaging  XR Pelvis 1/2 Views   Final Result   IMPRESSION: Normal joint spaces and alignment. No definite fracture on   the single view of the pelvis.      SERA XAVIER DO            SYSTEM ID:  [id number]      Fingers XR, 2-3 views, left   Final Result   IMPRESSION:       Right: Normal joint spaces and alignment. Linear sclerosis involving   the waist of the scaphoid which may be artifactual, however, a   nondisplaced fracture cannot be entirely excluded. If there is   snuffbox tenderness and clinical concern for scaphoid fracture,   conservative management and follow-up radiograph in 7-10 days is   suggested.      Left: Prior screw fixation of the scaphoid. Normal joint spaces and   alignment. No acute fracture.          SERA XAVIER DO            SYSTEM ID:  [id number]      XR Hand Right G/E 3 Views   Final Result   IMPRESSION:       Right: Normal joint spaces and alignment. Linear sclerosis involving   the waist of the scaphoid which may be artifactual, however, a   nondisplaced  fracture cannot be entirely excluded. If there is   snuffbox tenderness and clinical concern for scaphoid fracture,   conservative management and follow-up radiograph in 7-10 days is   suggested.      Left: Prior screw fixation of the scaphoid. Normal joint spaces and   alignment. No acute fracture.          SERA XAVIER DO            SYSTEM ID:  [id number]          EKG   ECG results from 06/10/21   EKG 12 lead     Value    Interpretation ECG Click View Image link to view waveform and result       Independent Interpretation by Dr. Quintero  I reviewed the patient's pelvic x-ray which shows no fracture.  His hand and thumb x-ray shows some sclerosis involving the scaphoid.  This could represent an old healed fracture as the patient has had numerous falls on the outstretched hands bilaterally secondary to his skateboarding activities.  Given that he is nontender in the snuffbox over the scaphoid at this time I doubt an acute fracture.  There is no fracture involving the first metacarpal phalangeal joint where the patient has tenderness.    ED Course    Medications Administered  Medications - No data to display    Procedures  Procedures     Discussion of Management      Social Determinants of Health adding to complexity of care  None    ED Course     Medical Decision Making / Diagnosis   CMS Diagnoses: None    MIPS     None    MDM  Zack Simmons is a 24 year old male who is right-hand dominant presents after a fall and injury to the right thumb.  The patient's tenderness is predominantly to the ulnar collateral ligament involving the first MCP joint.  This is consistent with a thumb sprain.  The patient does not have any significant tenderness involving the distal forearm bones nor is there any tenderness in the carpals or at the scaphoid.  His x-rays show sclerosis involving the scaphoid which could be secondary to an old fracture.  An acute fracture is possible and the patient was warned that even if he develops pain  in this bone he should get a repeat x-ray in 10 days with the orthopedic urgent care.  At this juncture the patient is placed in a thumb spica splint which will treat all of the injuries along the axis of the.  There is no evidence of pelvic avulsion fracture.    Disposition  The patient was discharged.     ICD-10 Codes:    ICD-10-CM    1. Sprain of metacarpophalangeal (MCP) joint of right thumb, initial encounter  S63.641A Wrist/Arm/Hand Bracking Supplies Order Thumb Keeper Brace; Right           Discharge Medications  New Prescriptions    No medications on file         MD Leon Rosales Michael P, MD  07/26/24 1004

## 2024-07-26 NOTE — ED TRIAGE NOTES
Patient was skateboarding when he fell on a rail. Patient fell on hip and his thumb in the process. Patient reports 5/10 throbbing to right thumb and right hip. Right thumb swelling and abrasion to right hip     Triage Assessment (Adult)       Row Name 07/25/24 0516          Triage Assessment    Airway WDL WDL        Respiratory WDL    Respiratory WDL WDL        Skin Circulation/Temperature WDL    Skin Circulation/Temperature WDL X  abrasion and swelling        Cardiac WDL    Cardiac WDL WDL        Peripheral/Neurovascular WDL    Peripheral Neurovascular WDL WDL        Cognitive/Neuro/Behavioral WDL    Cognitive/Neuro/Behavioral WDL WDL

## 2024-07-26 NOTE — DISCHARGE INSTRUCTIONS
Wear the provided splint for the next 10 to 14 days as needed for comfort.  Ibuprofen as needed for the pain, 400 to 600 mg 3 times a day with food  Ice to the tender area 20 to 30 minutes 3 times per day for the next few days  Make an appointment with orthopedic surgery if you are having ongoing thumb or wrist pain in 10 days for repeat x-ray

## 2025-05-07 ENCOUNTER — OFFICE VISIT (OUTPATIENT)
Dept: URGENT CARE | Facility: URGENT CARE | Age: 25
End: 2025-05-07
Payer: COMMERCIAL

## 2025-05-07 VITALS
SYSTOLIC BLOOD PRESSURE: 118 MMHG | RESPIRATION RATE: 18 BRPM | OXYGEN SATURATION: 95 % | HEART RATE: 69 BPM | WEIGHT: 167 LBS | HEIGHT: 69 IN | BODY MASS INDEX: 24.73 KG/M2 | TEMPERATURE: 98.3 F | DIASTOLIC BLOOD PRESSURE: 80 MMHG

## 2025-05-07 DIAGNOSIS — R07.0 THROAT PAIN: ICD-10-CM

## 2025-05-07 DIAGNOSIS — J45.21 MILD INTERMITTENT REACTIVE AIRWAY DISEASE WITH ACUTE EXACERBATION: ICD-10-CM

## 2025-05-07 DIAGNOSIS — H66.002 NON-RECURRENT ACUTE SUPPURATIVE OTITIS MEDIA OF LEFT EAR WITHOUT SPONTANEOUS RUPTURE OF TYMPANIC MEMBRANE: ICD-10-CM

## 2025-05-07 DIAGNOSIS — R06.2 WHEEZING: Primary | ICD-10-CM

## 2025-05-07 LAB — DEPRECATED S PYO AG THROAT QL EIA: NEGATIVE

## 2025-05-07 PROCEDURE — 94640 AIRWAY INHALATION TREATMENT: CPT | Performed by: NURSE PRACTITIONER

## 2025-05-07 PROCEDURE — 3079F DIAST BP 80-89 MM HG: CPT | Performed by: NURSE PRACTITIONER

## 2025-05-07 PROCEDURE — 99214 OFFICE O/P EST MOD 30 MIN: CPT | Mod: 25 | Performed by: NURSE PRACTITIONER

## 2025-05-07 PROCEDURE — 3074F SYST BP LT 130 MM HG: CPT | Performed by: NURSE PRACTITIONER

## 2025-05-07 PROCEDURE — 87651 STREP A DNA AMP PROBE: CPT | Performed by: NURSE PRACTITIONER

## 2025-05-07 RX ORDER — PREDNISONE 20 MG/1
40 TABLET ORAL DAILY
Qty: 10 TABLET | Refills: 0 | Status: SHIPPED | OUTPATIENT
Start: 2025-05-07 | End: 2025-05-12

## 2025-05-07 RX ORDER — AMOXICILLIN 875 MG/1
875 TABLET, COATED ORAL 2 TIMES DAILY
Qty: 14 TABLET | Refills: 0 | Status: SHIPPED | OUTPATIENT
Start: 2025-05-07 | End: 2025-05-14

## 2025-05-07 RX ORDER — ALBUTEROL SULFATE 90 UG/1
2 INHALANT RESPIRATORY (INHALATION) EVERY 6 HOURS PRN
Qty: 18 G | Refills: 0 | Status: SHIPPED | OUTPATIENT
Start: 2025-05-07 | End: 2025-05-17

## 2025-05-07 RX ORDER — ALBUTEROL SULFATE 1.25 MG/3ML
1.25 SOLUTION RESPIRATORY (INHALATION) ONCE
Status: COMPLETED | OUTPATIENT
Start: 2025-05-07 | End: 2025-05-07

## 2025-05-07 RX ADMIN — ALBUTEROL SULFATE 1.25 MG: 1.25 SOLUTION RESPIRATORY (INHALATION) at 17:25

## 2025-05-07 NOTE — PROGRESS NOTES
Urgent Care Clinic Visit    Chief Complaint   Patient presents with    Urgent Care    Cough     X3 days of cough  Worse at night   Dry cough but feels congested in chest     Shortness of Breath    Night Sweats     Night sweats and chills                5/7/2025     4:41 PM   Additional Questions   Roomed by kathy romero

## 2025-05-07 NOTE — PROGRESS NOTES
"Chief Complaint   Patient presents with    Urgent Care    Cough     X3 days of cough  Worse at night   Dry cough but feels congested in chest     Shortness of Breath    Night Sweats     Night sweats and chills      SUBJECTIVE:  Zack Simmons is a 24 year old male who  presents today with cough congestion sore throat redness swelling trouble swallowing night sweats chills midsternal chest pain shortness of breath myalgias weakness GI upset over the past 3 days.  His grandma was recently sick with a URI.  No specific concern for COVID or the flu.  He is a smoker and has cut back right now.  Declines bloody sputum.  No asthma history ever needing nebs or inhalers.    Past Medical History:   Diagnosis Date    Closed fracture of nasal bone 3/24/2017    3/12/17    Concussion 9/17/2014    Enuresis     Fibula fracture 10/9/2013    Migraine     Nocturnal enuresis 10/26/2012    Seasonal allergies     Traumatic brain injury (H) 9/30/2014     Current Outpatient Medications   Medication Sig Dispense Refill    albuterol (PROAIR HFA/PROVENTIL HFA/VENTOLIN HFA) 108 (90 Base) MCG/ACT inhaler Inhale 2 puffs into the lungs every 6 hours as needed for shortness of breath, wheezing or cough. 18 g 0    amoxicillin (AMOXIL) 875 MG tablet Take 1 tablet (875 mg) by mouth 2 times daily for 7 days. 14 tablet 0    predniSONE (DELTASONE) 20 MG tablet Take 2 tablets (40 mg) by mouth daily for 5 days. 10 tablet 0     Social History     Tobacco Use    Smoking status: Every Day     Types: Cigarettes    Smokeless tobacco: Never   Substance Use Topics    Alcohol use: No     Allergies   Allergen Reactions    Birch Trees     Cats      Review of Systems  ROS: 10 point ROS neg other than the symptoms noted above in the HPI.    OBJECTIVE:  /80   Pulse 69   Temp 98.3  F (36.8  C) (Oral)   Resp 18   Ht 1.753 m (5' 9\")   Wt 75.8 kg (167 lb)   SpO2 95%   BMI 24.66 kg/m      Physical Exam  Vitals reviewed.   Constitutional:       General: He is " not in acute distress.     Appearance: Normal appearance. He is not ill-appearing, toxic-appearing or diaphoretic.   HENT:      Head: Normocephalic and atraumatic.      Right Ear: Tympanic membrane and ear canal normal.      Ears:      Comments: Left TM is mildly erythematous and bulging.     Nose: Congestion and rhinorrhea present.      Mouth/Throat:      Mouth: Mucous membranes are moist.      Pharynx: Oropharynx is clear. Posterior oropharyngeal erythema present.   Eyes:      Extraocular Movements: Extraocular movements intact.      Conjunctiva/sclera: Conjunctivae normal.      Pupils: Pupils are equal, round, and reactive to light.   Cardiovascular:      Rate and Rhythm: Normal rate.      Pulses: Normal pulses.   Pulmonary:      Effort: Respiratory distress present.      Breath sounds: No stridor. Wheezing present. No rhonchi or rales.   Chest:      Chest wall: No tenderness.   Musculoskeletal:         General: Normal range of motion.      Cervical back: Normal range of motion and neck supple.   Lymphadenopathy:      Cervical: Cervical adenopathy present.   Skin:     General: Skin is warm and dry.      Findings: No rash.   Neurological:      General: No focal deficit present.      Mental Status: He is alert and oriented to person, place, and time.   Psychiatric:         Mood and Affect: Mood normal.         Behavior: Behavior normal.       Results for orders placed or performed in visit on 05/07/25   Streptococcus A Rapid Screen w/Reflex to PCR - Clinic Collect     Status: Normal    Specimen: Throat; Swab   Result Value Ref Range    Group A Strep antigen Negative Negative     ASSESSMENT:    ICD-10-CM    1. Wheezing  R06.2 albuterol (ACCUNEB) nebulizer solution 1.25 mg     albuterol (PROAIR HFA/PROVENTIL HFA/VENTOLIN HFA) 108 (90 Base) MCG/ACT inhaler     predniSONE (DELTASONE) 20 MG tablet      2. Throat pain  R07.0 Streptococcus A Rapid Screen w/Reflex to PCR - Clinic Collect     Group A Streptococcus PCR  Throat Swab      3. Non-recurrent acute suppurative otitis media of left ear without spontaneous rupture of tympanic membrane  H66.002 amoxicillin (AMOXIL) 875 MG tablet      4. Mild intermittent reactive airway disease with acute exacerbation  J45.21 albuterol (PROAIR HFA/PROVENTIL HFA/VENTOLIN HFA) 108 (90 Base) MCG/ACT inhaler     predniSONE (DELTASONE) 20 MG tablet        PLAN:     Viral URI with cough wheezing reactive airway  Albuterol neb helped greatly in the clinic  Albuterol inhaler prescribed as well as prednisone  Amoxicillin watchful waiting for left ear infection  Patient would like to hold on COVID flu  Shared decision to hold on x-ray at this time  Rest! Your body needs more rest to heal.  Drink plenty of fluids (warm fluids like tea or soup are soothing and reduce cough)  Sit in the bathroom with a hot shower running and breathe in the steam.  Honey may soothe your sore throat and help manage your cough- may take straight or in warm water with lemon juice.  Avoid smoke (cigarettes, bonfires, fireplace, wood burning stoves).  Take Tylenol or an NSAID such as ibuprofen or naproxen as needed for pain.  Delsym (dextromethorphan polistirex) is an over the counter cough medication that lasts 12 hours.   Mucinex or Robitussin (guiafenesin) thin mucus and may help it to loosen more quickly    Good handwashing is the best way to prevent spread of germs  Present to emergency room if you develop trouble breathing, swallowing or cough-up blood.  Follow up with your primary care provider if symptoms worsen or fail to improve as expected.    Follow up with primary care provider with any problems, questions or concerns or if symptoms worsen or fail to improve. Patient agreed to plan and verbalized understanding.    Valerie Drake, [facility]

## 2025-05-08 LAB — S PYO DNA THROAT QL NAA+PROBE: NOT DETECTED

## 2025-08-16 ENCOUNTER — APPOINTMENT (OUTPATIENT)
Dept: GENERAL RADIOLOGY | Facility: CLINIC | Age: 25
End: 2025-08-16
Attending: EMERGENCY MEDICINE
Payer: COMMERCIAL

## 2025-08-16 ENCOUNTER — HOSPITAL ENCOUNTER (EMERGENCY)
Facility: CLINIC | Age: 25
Discharge: HOME | End: 2025-08-16
Attending: EMERGENCY MEDICINE | Admitting: EMERGENCY MEDICINE
Payer: COMMERCIAL

## 2025-08-16 VITALS
HEIGHT: 69 IN | OXYGEN SATURATION: 97 % | BODY MASS INDEX: 25.18 KG/M2 | RESPIRATION RATE: 20 BRPM | DIASTOLIC BLOOD PRESSURE: 64 MMHG | SYSTOLIC BLOOD PRESSURE: 101 MMHG | TEMPERATURE: 97.9 F | WEIGHT: 170 LBS | HEART RATE: 69 BPM

## 2025-08-16 DIAGNOSIS — M54.6 ACUTE MIDLINE THORACIC BACK PAIN: Primary | ICD-10-CM

## 2025-08-16 PROCEDURE — 99283 EMERGENCY DEPT VISIT LOW MDM: CPT | Mod: 25 | Performed by: EMERGENCY MEDICINE

## 2025-08-16 PROCEDURE — 71046 X-RAY EXAM CHEST 2 VIEWS: CPT

## 2025-08-16 PROCEDURE — 72072 X-RAY EXAM THORAC SPINE 3VWS: CPT

## 2025-08-16 RX ORDER — CYCLOBENZAPRINE HCL 5 MG
5-10 TABLET ORAL 3 TIMES DAILY PRN
Qty: 15 TABLET | Refills: 0 | Status: SHIPPED | OUTPATIENT
Start: 2025-08-16

## 2025-08-16 ASSESSMENT — COLUMBIA-SUICIDE SEVERITY RATING SCALE - C-SSRS
2. HAVE YOU ACTUALLY HAD ANY THOUGHTS OF KILLING YOURSELF IN THE PAST MONTH?: NO
1. IN THE PAST MONTH, HAVE YOU WISHED YOU WERE DEAD OR WISHED YOU COULD GO TO SLEEP AND NOT WAKE UP?: NO
6. HAVE YOU EVER DONE ANYTHING, STARTED TO DO ANYTHING, OR PREPARED TO DO ANYTHING TO END YOUR LIFE?: NO

## 2025-08-16 ASSESSMENT — ACTIVITIES OF DAILY LIVING (ADL)
ADLS_ACUITY_SCORE: 41
ADLS_ACUITY_SCORE: 41